# Patient Record
Sex: FEMALE | Race: WHITE | HISPANIC OR LATINO | URBAN - METROPOLITAN AREA
[De-identification: names, ages, dates, MRNs, and addresses within clinical notes are randomized per-mention and may not be internally consistent; named-entity substitution may affect disease eponyms.]

---

## 2018-03-08 ENCOUNTER — APPOINTMENT (RX ONLY)
Dept: URBAN - METROPOLITAN AREA CLINIC 93 | Facility: CLINIC | Age: 58
Setting detail: DERMATOLOGY
End: 2018-03-08

## 2018-03-08 DIAGNOSIS — L50.8 OTHER URTICARIA: ICD-10-CM

## 2018-03-08 DIAGNOSIS — B00.1 HERPESVIRAL VESICULAR DERMATITIS: ICD-10-CM

## 2018-03-08 PROCEDURE — ? PRESCRIPTION

## 2018-03-08 PROCEDURE — 99213 OFFICE O/P EST LOW 20 MIN: CPT

## 2018-03-08 PROCEDURE — ? COUNSELING

## 2018-03-08 PROCEDURE — ? PATIENT EDUCATION

## 2018-03-08 RX ORDER — LORATADINE 10 MG/1
TABLET ORAL
Qty: 30 | Refills: 5 | Status: ERX | COMMUNITY
Start: 2018-03-08

## 2018-03-08 RX ORDER — LEVOCETIRIZINE DIHYDROCHLORIDE 5 MG
TABLET ORAL
Qty: 30 | Refills: 5 | Status: ERX | COMMUNITY
Start: 2018-03-08

## 2018-03-08 RX ORDER — TRIAMCINOLONE ACETONIDE 0.25 MG/G
CREAM TOPICAL
Qty: 1 | Refills: 0 | Status: ERX | COMMUNITY
Start: 2018-03-08

## 2018-03-08 RX ADMIN — TRIAMCINOLONE ACETONIDE: 0.25 CREAM TOPICAL at 00:00

## 2018-03-08 RX ADMIN — LORATADINE: 10 TABLET ORAL at 00:00

## 2018-03-08 RX ADMIN — Medication: at 00:00

## 2018-03-08 ASSESSMENT — LOCATION SIMPLE DESCRIPTION DERM
LOCATION SIMPLE: ABDOMEN
LOCATION SIMPLE: RIGHT FOREARM
LOCATION SIMPLE: LEFT FOREARM

## 2018-03-08 ASSESSMENT — LOCATION DETAILED DESCRIPTION DERM
LOCATION DETAILED: RIGHT VENTRAL PROXIMAL FOREARM
LOCATION DETAILED: EPIGASTRIC SKIN
LOCATION DETAILED: LEFT VENTRAL PROXIMAL FOREARM

## 2018-03-08 ASSESSMENT — LOCATION ZONE DERM
LOCATION ZONE: TRUNK
LOCATION ZONE: ARM

## 2018-03-08 NOTE — HPI: HIVES (URTICARIA)
How Severe Are Your Hives?: mild
Is This A New Presentation, Or A Follow-Up?: Follow Up Urticaria
Additional History: Lov 8/10/17

## 2018-03-08 NOTE — PROCEDURE: PATIENT EDUCATION
Include Automatic Impression Counseling If It Exists: Yes
Render Patient Education In Note?: render abridged patient education
Abridged Text (If No Specifics Are Selected): Educational resources were provided and detailed information can be found on the patient education handout.
Detail Level: Generalized

## 2018-08-29 ENCOUNTER — APPOINTMENT (RX ONLY)
Dept: URBAN - METROPOLITAN AREA CLINIC 93 | Facility: CLINIC | Age: 58
Setting detail: DERMATOLOGY
End: 2018-08-29

## 2018-08-29 DIAGNOSIS — L73.8 OTHER SPECIFIED FOLLICULAR DISORDERS: ICD-10-CM

## 2018-08-29 DIAGNOSIS — L82.1 OTHER SEBORRHEIC KERATOSIS: ICD-10-CM

## 2018-08-29 DIAGNOSIS — L50.1 IDIOPATHIC URTICARIA: ICD-10-CM

## 2018-08-29 PROCEDURE — ? COUNSELING

## 2018-08-29 PROCEDURE — 99213 OFFICE O/P EST LOW 20 MIN: CPT

## 2018-08-29 PROCEDURE — ? PRESCRIPTION

## 2018-08-29 RX ORDER — LEVOCETIRIZINE DIHYDROCHLORIDE 5 MG/1
TABLET, FILM COATED ORAL
Qty: 30 | Refills: 5 | Status: ERX | COMMUNITY
Start: 2018-08-29

## 2018-08-29 RX ORDER — LORATADINE 10 MG/1
CAPSULE, LIQUID FILLED ORAL
Qty: 30 | Refills: 5 | Status: ERX | COMMUNITY
Start: 2018-08-29

## 2018-08-29 RX ORDER — TRIAMCINOLONE ACETONIDE 0.25 MG/G
CREAM TOPICAL
Qty: 1 | Refills: 3 | Status: ERX

## 2018-08-29 RX ADMIN — LORATADINE: 10 CAPSULE, LIQUID FILLED ORAL at 00:00

## 2018-08-29 RX ADMIN — LEVOCETIRIZINE DIHYDROCHLORIDE: 5 TABLET, FILM COATED ORAL at 00:00

## 2018-08-29 ASSESSMENT — LOCATION DETAILED DESCRIPTION DERM
LOCATION DETAILED: RIGHT ANTERIOR PROXIMAL THIGH
LOCATION DETAILED: LEFT LATERAL ABDOMEN
LOCATION DETAILED: RIGHT ANTECUBITAL SKIN
LOCATION DETAILED: PERIUMBILICAL SKIN
LOCATION DETAILED: LEFT ANTERIOR DISTAL THIGH

## 2018-08-29 ASSESSMENT — LOCATION ZONE DERM
LOCATION ZONE: LEG
LOCATION ZONE: TRUNK
LOCATION ZONE: ARM

## 2018-08-29 ASSESSMENT — LOCATION SIMPLE DESCRIPTION DERM
LOCATION SIMPLE: LEFT THIGH
LOCATION SIMPLE: ABDOMEN
LOCATION SIMPLE: RIGHT THIGH
LOCATION SIMPLE: RIGHT UPPER ARM

## 2019-10-03 ENCOUNTER — APPOINTMENT (RX ONLY)
Dept: URBAN - METROPOLITAN AREA CLINIC 93 | Facility: CLINIC | Age: 59
Setting detail: DERMATOLOGY
End: 2019-10-03

## 2019-10-03 DIAGNOSIS — L81.4 OTHER MELANIN HYPERPIGMENTATION: ICD-10-CM

## 2019-10-03 DIAGNOSIS — L50.1 IDIOPATHIC URTICARIA: ICD-10-CM

## 2019-10-03 PROCEDURE — ? COUNSELING

## 2019-10-03 PROCEDURE — ? ADDITIONAL NOTES

## 2019-10-03 PROCEDURE — ? PRESCRIPTION

## 2019-10-03 PROCEDURE — 99213 OFFICE O/P EST LOW 20 MIN: CPT

## 2019-10-03 RX ORDER — HYDROQUINONE 4 %
CREAM (GRAM) TOPICAL
Qty: 1 | Refills: 3 | Status: ERX | COMMUNITY
Start: 2019-10-03

## 2019-10-03 RX ORDER — TRIAMCINOLONE ACETONIDE 1 MG/G
CREAM TOPICAL
Qty: 1 | Refills: 1 | Status: ERX | COMMUNITY
Start: 2019-10-03

## 2019-10-03 RX ORDER — LEVOCETIRIZINE DIHYDROCHLORIDE 5 MG/1
TABLET, FILM COATED ORAL
Qty: 90 | Refills: 3 | Status: ERX

## 2019-10-03 RX ORDER — LORATADINE 10 MG/1
CAPSULE, LIQUID FILLED ORAL
Qty: 90 | Refills: 3 | Status: ERX

## 2019-10-03 RX ADMIN — TRIAMCINOLONE ACETONIDE: 1 CREAM TOPICAL at 00:00

## 2019-10-03 RX ADMIN — Medication: at 00:00

## 2019-10-03 ASSESSMENT — LOCATION SIMPLE DESCRIPTION DERM: LOCATION SIMPLE: RIGHT UPPER ARM

## 2019-10-03 ASSESSMENT — LOCATION ZONE DERM: LOCATION ZONE: ARM

## 2019-10-03 ASSESSMENT — LOCATION DETAILED DESCRIPTION DERM: LOCATION DETAILED: RIGHT ANTECUBITAL SKIN

## 2020-03-12 ENCOUNTER — RX ONLY (OUTPATIENT)
Age: 60
Setting detail: RX ONLY
End: 2020-03-12

## 2020-06-29 ENCOUNTER — APPOINTMENT (RX ONLY)
Dept: URBAN - METROPOLITAN AREA CLINIC 93 | Facility: CLINIC | Age: 60
Setting detail: DERMATOLOGY
End: 2020-06-29

## 2020-06-29 DIAGNOSIS — L50.1 IDIOPATHIC URTICARIA: ICD-10-CM

## 2020-06-29 DIAGNOSIS — L82.0 INFLAMED SEBORRHEIC KERATOSIS: ICD-10-CM

## 2020-06-29 DIAGNOSIS — L82.1 OTHER SEBORRHEIC KERATOSIS: ICD-10-CM

## 2020-06-29 DIAGNOSIS — B00.1 HERPESVIRAL VESICULAR DERMATITIS: ICD-10-CM

## 2020-06-29 DIAGNOSIS — L81.4 OTHER MELANIN HYPERPIGMENTATION: ICD-10-CM

## 2020-06-29 DIAGNOSIS — L56.5 DISSEMINATED SUPERFICIAL ACTINIC POROKERATOSIS (DSAP): ICD-10-CM

## 2020-06-29 PROCEDURE — ? LIQUID NITROGEN

## 2020-06-29 PROCEDURE — ? COUNSELING

## 2020-06-29 PROCEDURE — 99213 OFFICE O/P EST LOW 20 MIN: CPT | Mod: 25

## 2020-06-29 PROCEDURE — 17110 DESTRUCTION B9 LES UP TO 14: CPT

## 2020-06-29 PROCEDURE — ? PRESCRIPTION

## 2020-06-29 RX ORDER — LORATADINE 10 MG/1
CAPSULE, LIQUID FILLED ORAL
Qty: 90 | Refills: 3 | Status: ERX

## 2020-06-29 RX ORDER — VALACYCLOVIR HYDROCHLORIDE 500 MG/1
TABLET, FILM COATED ORAL
Qty: 90 | Refills: 2 | Status: ERX | COMMUNITY
Start: 2020-06-29

## 2020-06-29 RX ORDER — TRIAMCINOLONE ACETONIDE 1 MG/G
CREAM TOPICAL
Qty: 1 | Refills: 3 | Status: ERX

## 2020-06-29 RX ORDER — LEVOCETIRIZINE DIHYDROCHLORIDE 5 MG/1
TABLET, FILM COATED ORAL
Qty: 90 | Refills: 3 | Status: ERX

## 2020-06-29 RX ADMIN — VALACYCLOVIR HYDROCHLORIDE: 500 TABLET, FILM COATED ORAL at 00:00

## 2020-06-29 ASSESSMENT — LOCATION SIMPLE DESCRIPTION DERM
LOCATION SIMPLE: LEFT THIGH
LOCATION SIMPLE: RIGHT UPPER ARM

## 2020-06-29 ASSESSMENT — LOCATION ZONE DERM
LOCATION ZONE: LEG
LOCATION ZONE: ARM

## 2020-06-29 ASSESSMENT — LOCATION DETAILED DESCRIPTION DERM
LOCATION DETAILED: LEFT ANTERIOR PROXIMAL THIGH
LOCATION DETAILED: RIGHT ANTECUBITAL SKIN

## 2020-06-29 NOTE — PROCEDURE: LIQUID NITROGEN
Medical Necessity Information: It is in your best interest to select a reason for this procedure from the list below. All of these items fulfill various CMS LCD requirements except the new and changing color options.
Consent: The patient's consent was obtained including but not limited to risks of crusting, scabbing, blistering, scarring, darker or lighter pigmentary change, recurrence, incomplete removal and infection.
Add 52 Modifier (Optional): no
Detail Level: Simple
Medical Necessity Clause: This procedure was medically necessary because the lesions that were treated were:
Post-Care Instructions: I reviewed with the patient in detail post-care instructions. Patient is to wear sunprotection, and avoid picking at any of the treated lesions. Pt may apply Vaseline to crusted or scabbing areas.

## 2020-07-15 ENCOUNTER — APPOINTMENT (RX ONLY)
Dept: URBAN - METROPOLITAN AREA CLINIC 93 | Facility: CLINIC | Age: 60
Setting detail: DERMATOLOGY
End: 2020-07-15

## 2020-07-15 DIAGNOSIS — L82.0 INFLAMED SEBORRHEIC KERATOSIS: ICD-10-CM

## 2020-07-15 DIAGNOSIS — L01.01 NON-BULLOUS IMPETIGO: ICD-10-CM

## 2020-07-15 PROCEDURE — ? ADDITIONAL NOTES

## 2020-07-15 PROCEDURE — ? PRESCRIPTION

## 2020-07-15 PROCEDURE — ? FULL BODY SKIN EXAM - DECLINED

## 2020-07-15 PROCEDURE — ? COUNSELING

## 2020-07-15 PROCEDURE — 99213 OFFICE O/P EST LOW 20 MIN: CPT

## 2020-07-15 RX ORDER — MUPIROCIN 20 MG/G
OINTMENT TOPICAL
Qty: 1 | Refills: 1 | Status: ERX | COMMUNITY
Start: 2020-07-15

## 2020-07-15 RX ADMIN — MUPIROCIN: 20 OINTMENT TOPICAL at 00:00

## 2020-07-15 ASSESSMENT — LOCATION SIMPLE DESCRIPTION DERM: LOCATION SIMPLE: LEFT THIGH

## 2020-07-15 ASSESSMENT — LOCATION ZONE DERM: LOCATION ZONE: LEG

## 2020-07-15 ASSESSMENT — LOCATION DETAILED DESCRIPTION DERM: LOCATION DETAILED: LEFT ANTERIOR PROXIMAL THIGH

## 2020-07-15 NOTE — PROCEDURE: ADDITIONAL NOTES
Detail Level: Simple
Additional Notes: Patient comes into the office today for evaluation of ISKs that were treated with LN2 last week. Patient was concerned about infection and improper healing. Patient was reassured that there are no signs of infection but because there is some crusting, we will prescribe Mupirocin to reduce the risk of developing an infection. Also, we discussed that these skin lesions may take 4-6 weeks to fully heal. Will follow up in 1 month to reassess.

## 2020-10-14 ENCOUNTER — APPOINTMENT (RX ONLY)
Dept: URBAN - METROPOLITAN AREA CLINIC 93 | Facility: CLINIC | Age: 60
Setting detail: DERMATOLOGY
End: 2020-10-14

## 2020-10-14 DIAGNOSIS — L81.1 CHLOASMA: ICD-10-CM

## 2020-10-14 DIAGNOSIS — T07XXXA INSECT BITE, NONVENOMOUS, OF OTHER, MULTIPLE, AND UNSPECIFIED SITES, WITHOUT MENTION OF INFECTION: ICD-10-CM

## 2020-10-14 DIAGNOSIS — L82.0 INFLAMED SEBORRHEIC KERATOSIS: ICD-10-CM

## 2020-10-14 DIAGNOSIS — L50.1 IDIOPATHIC URTICARIA: ICD-10-CM

## 2020-10-14 DIAGNOSIS — L01.01 NON-BULLOUS IMPETIGO: ICD-10-CM | Status: RESOLVED

## 2020-10-14 PROBLEM — S00.86XA INSECT BITE (NONVENOMOUS) OF OTHER PART OF HEAD, INITIAL ENCOUNTER: Status: ACTIVE | Noted: 2020-10-14

## 2020-10-14 PROCEDURE — ? COUNSELING

## 2020-10-14 PROCEDURE — ? ADDITIONAL NOTES

## 2020-10-14 PROCEDURE — ? PRESCRIPTION

## 2020-10-14 PROCEDURE — 99214 OFFICE O/P EST MOD 30 MIN: CPT

## 2020-10-14 PROCEDURE — ? PHOTO-DOCUMENTATION

## 2020-10-14 PROCEDURE — ? FULL BODY SKIN EXAM - DECLINED

## 2020-10-14 RX ORDER — PHARMACY COMPOUNDING ACCESSORY
EACH MISCELLANEOUS
Qty: 1 | Refills: 1 | Status: ERX | COMMUNITY
Start: 2020-10-14

## 2020-10-14 RX ORDER — LEVOCETIRIZINE DIHYDROCHLORIDE 5 MG/1
TABLET, FILM COATED ORAL
Qty: 30 | Refills: 2 | Status: ERX

## 2020-10-14 RX ORDER — LORATADINE 10 MG/1
CAPSULE, LIQUID FILLED ORAL
Qty: 30 | Refills: 2 | Status: ERX

## 2020-10-14 RX ORDER — HYDROCORTISONE 25 MG/G
OINTMENT TOPICAL
Qty: 1 | Refills: 0 | Status: ERX | COMMUNITY
Start: 2020-10-14

## 2020-10-14 RX ADMIN — HYDROCORTISONE: 25 OINTMENT TOPICAL at 00:00

## 2020-10-14 RX ADMIN — Medication: at 00:00

## 2020-10-14 ASSESSMENT — LOCATION DETAILED DESCRIPTION DERM
LOCATION DETAILED: LEFT CENTRAL MALAR CHEEK
LOCATION DETAILED: RIGHT ANTECUBITAL SKIN
LOCATION DETAILED: RIGHT CENTRAL MALAR CHEEK
LOCATION DETAILED: RIGHT LATERAL FOREHEAD
LOCATION DETAILED: LEFT ANTERIOR PROXIMAL THIGH

## 2020-10-14 ASSESSMENT — LOCATION SIMPLE DESCRIPTION DERM
LOCATION SIMPLE: LEFT CHEEK
LOCATION SIMPLE: RIGHT FOREHEAD
LOCATION SIMPLE: LEFT THIGH
LOCATION SIMPLE: RIGHT UPPER ARM
LOCATION SIMPLE: RIGHT CHEEK

## 2020-10-14 ASSESSMENT — LOCATION ZONE DERM
LOCATION ZONE: LEG
LOCATION ZONE: ARM
LOCATION ZONE: FACE

## 2020-10-14 NOTE — PROCEDURE: ADDITIONAL NOTES
Detail Level: Simple
Additional Notes: Patient comes into the office today for evaluation of ISKs that were treated with LN2 on 6/29/2020. Patient was concerned about infection and improper healing; therefore, we prescribed Mupirocin to reduce the risk of developing an infection. Today, the lesion appears to have healed and there is evidence of PIH. Patient is aware that this will continue to improve over the next few months.

## 2020-10-14 NOTE — PROCEDURE: FULL BODY SKIN EXAM - DECLINED
Detail Level: Simple
Instructions: This plan will send the code FBSD to the PM system.  DO NOT or CHANGE the price.
Price (Do Not Change): 0.00
Eyes with no visual disturbances.  Ears clean and dry and no hearing difficulties. Nose with pink mucosa and no drainage.  Mouth mucous membranes moist and pink.  No tenderness or swelling to throat or neck.

## 2021-05-19 ENCOUNTER — APPOINTMENT (RX ONLY)
Dept: URBAN - METROPOLITAN AREA CLINIC 93 | Facility: CLINIC | Age: 61
Setting detail: DERMATOLOGY
End: 2021-05-19

## 2021-05-19 DIAGNOSIS — L50.1 IDIOPATHIC URTICARIA: ICD-10-CM | Status: INADEQUATELY CONTROLLED

## 2021-05-19 DIAGNOSIS — L81.1 CHLOASMA: ICD-10-CM | Status: IMPROVED

## 2021-05-19 DIAGNOSIS — T07XXXA INSECT BITE, NONVENOMOUS, OF OTHER, MULTIPLE, AND UNSPECIFIED SITES, WITHOUT MENTION OF INFECTION: ICD-10-CM | Status: RESOLVING

## 2021-05-19 DIAGNOSIS — L82.1 OTHER SEBORRHEIC KERATOSIS: ICD-10-CM | Status: STABLE

## 2021-05-19 PROBLEM — S50.361A INSECT BITE (NONVENOMOUS) OF RIGHT ELBOW, INITIAL ENCOUNTER: Status: ACTIVE | Noted: 2021-05-19

## 2021-05-19 PROCEDURE — ? TREATMENT REGIMEN

## 2021-05-19 PROCEDURE — 99214 OFFICE O/P EST MOD 30 MIN: CPT

## 2021-05-19 PROCEDURE — ? ADDITIONAL NOTES

## 2021-05-19 PROCEDURE — ? FULL BODY SKIN EXAM - DECLINED

## 2021-05-19 PROCEDURE — ? PRESCRIPTION

## 2021-05-19 PROCEDURE — ? COUNSELING

## 2021-05-19 RX ORDER — LORATADINE 10 MG/1
CAPSULE, LIQUID FILLED ORAL
Qty: 30 | Refills: 2 | Status: ERX

## 2021-05-19 RX ORDER — PHARMACY COMPOUNDING ACCESSORY
EACH MISCELLANEOUS
Qty: 1 | Refills: 1 | Status: ERX | COMMUNITY
Start: 2021-05-19

## 2021-05-19 RX ORDER — LEVOCETIRIZINE DIHYDROCHLORIDE 5 MG/1
TABLET, FILM COATED ORAL
Qty: 30 | Refills: 2 | Status: ERX

## 2021-05-19 RX ADMIN — Medication: at 00:00

## 2021-05-19 ASSESSMENT — LOCATION SIMPLE DESCRIPTION DERM
LOCATION SIMPLE: RIGHT CHEEK
LOCATION SIMPLE: RIGHT ELBOW
LOCATION SIMPLE: RIGHT UPPER ARM
LOCATION SIMPLE: LEFT CHEEK

## 2021-05-19 ASSESSMENT — LOCATION DETAILED DESCRIPTION DERM
LOCATION DETAILED: RIGHT CENTRAL BUCCAL CHEEK
LOCATION DETAILED: RIGHT CENTRAL MALAR CHEEK
LOCATION DETAILED: LEFT CENTRAL MALAR CHEEK
LOCATION DETAILED: RIGHT ANTECUBITAL SKIN
LOCATION DETAILED: RIGHT ELBOW

## 2021-05-19 ASSESSMENT — LOCATION ZONE DERM
LOCATION ZONE: ARM
LOCATION ZONE: FACE

## 2021-05-19 NOTE — PROCEDURE: ADDITIONAL NOTES
Additional Notes: Upon evaluation today, site looks like a resolving arthropod assault.
Detail Level: Simple
Render Risk Assessment In Note?: no

## 2021-05-19 NOTE — PROCEDURE: TREATMENT REGIMEN
Continue Regimen: Topical lightening cream - apply a small amount to the dark spots on the face 2-3 nights a week
Detail Level: Zone
Otc Regimen: Apply sunscreen SPF +35 to the face daily. Reapply every two hours during sun exposure.
Continue Regimen: levocetirizine 5 mg tablet: Take one tab PO QHS\\nloratadine 10 mg capsule: Take one tab PO QAM
Plan: Treatment goal is clear skin. Patient is not at treatment goal.

## 2021-08-17 RX ORDER — LEVOCETIRIZINE DIHYDROCHLORIDE 5 MG/1
TABLET, FILM COATED ORAL
Qty: 30 | Refills: 3 | Status: CANCELLED
Stop reason: CLARIF

## 2021-10-01 ENCOUNTER — RX ONLY (OUTPATIENT)
Age: 61
Setting detail: RX ONLY
End: 2021-10-01

## 2021-10-01 RX ORDER — LEVOCETIRIZINE DIHYDROCHLORIDE 5 MG/1
TABLET, FILM COATED ORAL
Qty: 30 | Refills: 2 | Status: ERX

## 2021-10-01 RX ORDER — LORATADINE 10 MG/1
CAPSULE, LIQUID FILLED ORAL
Qty: 30 | Refills: 2 | Status: CANCELLED | COMMUNITY
Start: 2021-10-01

## 2021-10-01 RX ORDER — LORATADINE 10 MG/1
CAPSULE, LIQUID FILLED ORAL
Qty: 30 | Refills: 2 | Status: ERX

## 2021-10-01 RX ORDER — LEVOCETIRIZINE DIHYDROCHLORIDE 5 MG/1
TABLET, FILM COATED ORAL
Qty: 30 | Refills: 2 | Status: CANCELLED | COMMUNITY
Start: 2021-10-01

## 2022-07-11 ENCOUNTER — APPOINTMENT (RX ONLY)
Dept: URBAN - METROPOLITAN AREA CLINIC 93 | Facility: CLINIC | Age: 62
Setting detail: DERMATOLOGY
End: 2022-07-11

## 2022-07-11 DIAGNOSIS — L81.4 OTHER MELANIN HYPERPIGMENTATION: ICD-10-CM

## 2022-07-11 DIAGNOSIS — Z12.83 ENCOUNTER FOR SCREENING FOR MALIGNANT NEOPLASM OF SKIN: ICD-10-CM

## 2022-07-11 DIAGNOSIS — L81.1 CHLOASMA: ICD-10-CM

## 2022-07-11 DIAGNOSIS — D18.0 HEMANGIOMA: ICD-10-CM

## 2022-07-11 DIAGNOSIS — L50.1 IDIOPATHIC URTICARIA: ICD-10-CM

## 2022-07-11 DIAGNOSIS — L82.1 OTHER SEBORRHEIC KERATOSIS: ICD-10-CM

## 2022-07-11 DIAGNOSIS — D22 MELANOCYTIC NEVI: ICD-10-CM

## 2022-07-11 PROBLEM — D18.01 HEMANGIOMA OF SKIN AND SUBCUTANEOUS TISSUE: Status: ACTIVE | Noted: 2022-07-11

## 2022-07-11 PROBLEM — D22.5 MELANOCYTIC NEVI OF TRUNK: Status: ACTIVE | Noted: 2022-07-11

## 2022-07-11 PROCEDURE — ? FULL BODY SKIN EXAM

## 2022-07-11 PROCEDURE — ? COUNSELING

## 2022-07-11 PROCEDURE — ? PRESCRIPTION

## 2022-07-11 PROCEDURE — 99214 OFFICE O/P EST MOD 30 MIN: CPT

## 2022-07-11 PROCEDURE — ? TREATMENT REGIMEN

## 2022-07-11 RX ORDER — PHARMACY COMPOUNDING ACCESSORY
EACH MISCELLANEOUS
Qty: 1 | Refills: 11 | Status: ERX | COMMUNITY
Start: 2022-07-11

## 2022-07-11 RX ORDER — LORATADINE 10 MG/1
CAPSULE, LIQUID FILLED ORAL
Qty: 30 | Refills: 2 | Status: ERX

## 2022-07-11 RX ORDER — LEVOCETIRIZINE DIHYDROCHLORIDE 5 MG/1
TABLET, FILM COATED ORAL
Qty: 30 | Refills: 2 | Status: ERX

## 2022-07-11 RX ADMIN — Medication: at 00:00

## 2022-07-11 ASSESSMENT — LOCATION ZONE DERM
LOCATION ZONE: FACE
LOCATION ZONE: ARM
LOCATION ZONE: TRUNK

## 2022-07-11 ASSESSMENT — LOCATION DETAILED DESCRIPTION DERM
LOCATION DETAILED: PERIUMBILICAL SKIN
LOCATION DETAILED: LEFT CENTRAL MALAR CHEEK
LOCATION DETAILED: RIGHT ANTECUBITAL SKIN
LOCATION DETAILED: LEFT PROXIMAL DORSAL FOREARM
LOCATION DETAILED: RIGHT CENTRAL MALAR CHEEK
LOCATION DETAILED: EPIGASTRIC SKIN
LOCATION DETAILED: LEFT SUPERIOR UPPER BACK
LOCATION DETAILED: LEFT MEDIAL SUPERIOR CHEST

## 2022-07-11 ASSESSMENT — LOCATION SIMPLE DESCRIPTION DERM
LOCATION SIMPLE: RIGHT CHEEK
LOCATION SIMPLE: LEFT UPPER BACK
LOCATION SIMPLE: CHEST
LOCATION SIMPLE: LEFT FOREARM
LOCATION SIMPLE: ABDOMEN
LOCATION SIMPLE: LEFT CHEEK
LOCATION SIMPLE: RIGHT UPPER ARM

## 2023-02-16 ENCOUNTER — LAB (OUTPATIENT)
Dept: LAB | Facility: HOSPITAL | Age: 63
End: 2023-02-16
Payer: MEDICAID

## 2023-02-16 ENCOUNTER — OFFICE VISIT (OUTPATIENT)
Dept: INTERNAL MEDICINE | Facility: CLINIC | Age: 63
End: 2023-02-16
Payer: MEDICAID

## 2023-02-16 VITALS
HEIGHT: 62 IN | SYSTOLIC BLOOD PRESSURE: 130 MMHG | BODY MASS INDEX: 25.76 KG/M2 | TEMPERATURE: 97.1 F | DIASTOLIC BLOOD PRESSURE: 78 MMHG | OXYGEN SATURATION: 99 % | HEART RATE: 90 BPM | WEIGHT: 140 LBS

## 2023-02-16 DIAGNOSIS — R73.03 PREDIABETES: ICD-10-CM

## 2023-02-16 DIAGNOSIS — M89.8X8 STERNAL MASS: Primary | ICD-10-CM

## 2023-02-16 DIAGNOSIS — Z82.49 FAMILY HISTORY OF HEART DISEASE: ICD-10-CM

## 2023-02-16 DIAGNOSIS — Z12.31 ENCOUNTER FOR SCREENING MAMMOGRAM FOR MALIGNANT NEOPLASM OF BREAST: ICD-10-CM

## 2023-02-16 DIAGNOSIS — E78.2 MIXED HYPERLIPIDEMIA: ICD-10-CM

## 2023-02-16 DIAGNOSIS — L50.9 HIVES: ICD-10-CM

## 2023-02-16 DIAGNOSIS — Z13.29 SCREENING FOR ENDOCRINE DISORDER: ICD-10-CM

## 2023-02-16 LAB
ALBUMIN SERPL-MCNC: 4.6 G/DL (ref 3.5–5.2)
ALBUMIN/GLOB SERPL: 1.7 G/DL
ALP SERPL-CCNC: 75 U/L (ref 39–117)
ALT SERPL W P-5'-P-CCNC: 9 U/L (ref 1–33)
ANION GAP SERPL CALCULATED.3IONS-SCNC: 8.3 MMOL/L (ref 5–15)
AST SERPL-CCNC: 16 U/L (ref 1–32)
BASOPHILS # BLD AUTO: 0.06 10*3/MM3 (ref 0–0.2)
BASOPHILS NFR BLD AUTO: 1 % (ref 0–1.5)
BILIRUB SERPL-MCNC: 0.3 MG/DL (ref 0–1.2)
BUN SERPL-MCNC: 13 MG/DL (ref 8–23)
BUN/CREAT SERPL: 18.3 (ref 7–25)
CALCIUM SPEC-SCNC: 9.6 MG/DL (ref 8.6–10.5)
CHLORIDE SERPL-SCNC: 104 MMOL/L (ref 98–107)
CHOLEST SERPL-MCNC: 213 MG/DL (ref 0–200)
CO2 SERPL-SCNC: 26.7 MMOL/L (ref 22–29)
CREAT SERPL-MCNC: 0.71 MG/DL (ref 0.57–1)
DEPRECATED RDW RBC AUTO: 39 FL (ref 37–54)
EGFRCR SERPLBLD CKD-EPI 2021: 96.3 ML/MIN/1.73
EOSINOPHIL # BLD AUTO: 0.33 10*3/MM3 (ref 0–0.4)
EOSINOPHIL NFR BLD AUTO: 5.3 % (ref 0.3–6.2)
ERYTHROCYTE [DISTWIDTH] IN BLOOD BY AUTOMATED COUNT: 12.9 % (ref 12.3–15.4)
GLOBULIN UR ELPH-MCNC: 2.7 GM/DL
GLUCOSE SERPL-MCNC: 91 MG/DL (ref 65–99)
HCT VFR BLD AUTO: 41.9 % (ref 34–46.6)
HDLC SERPL-MCNC: 55 MG/DL (ref 40–60)
HGB BLD-MCNC: 13.9 G/DL (ref 12–15.9)
IMM GRANULOCYTES # BLD AUTO: 0.01 10*3/MM3 (ref 0–0.05)
IMM GRANULOCYTES NFR BLD AUTO: 0.2 % (ref 0–0.5)
LDLC SERPL CALC-MCNC: 131 MG/DL (ref 0–100)
LDLC/HDLC SERPL: 2.32 {RATIO}
LYMPHOCYTES # BLD AUTO: 2.08 10*3/MM3 (ref 0.7–3.1)
LYMPHOCYTES NFR BLD AUTO: 33.3 % (ref 19.6–45.3)
MCH RBC QN AUTO: 28.3 PG (ref 26.6–33)
MCHC RBC AUTO-ENTMCNC: 33.2 G/DL (ref 31.5–35.7)
MCV RBC AUTO: 85.3 FL (ref 79–97)
MONOCYTES # BLD AUTO: 0.34 10*3/MM3 (ref 0.1–0.9)
MONOCYTES NFR BLD AUTO: 5.4 % (ref 5–12)
NEUTROPHILS NFR BLD AUTO: 3.43 10*3/MM3 (ref 1.7–7)
NEUTROPHILS NFR BLD AUTO: 54.8 % (ref 42.7–76)
NRBC BLD AUTO-RTO: 0 /100 WBC (ref 0–0.2)
PLATELET # BLD AUTO: 226 10*3/MM3 (ref 140–450)
PMV BLD AUTO: 10.9 FL (ref 6–12)
POTASSIUM SERPL-SCNC: 3.9 MMOL/L (ref 3.5–5.2)
PROT SERPL-MCNC: 7.3 G/DL (ref 6–8.5)
RBC # BLD AUTO: 4.91 10*6/MM3 (ref 3.77–5.28)
SODIUM SERPL-SCNC: 139 MMOL/L (ref 136–145)
TRIGL SERPL-MCNC: 153 MG/DL (ref 0–150)
VLDLC SERPL-MCNC: 27 MG/DL (ref 5–40)
WBC NRBC COR # BLD: 6.25 10*3/MM3 (ref 3.4–10.8)

## 2023-02-16 PROCEDURE — 99204 OFFICE O/P NEW MOD 45 MIN: CPT | Performed by: NURSE PRACTITIONER

## 2023-02-16 PROCEDURE — 80061 LIPID PANEL: CPT | Performed by: NURSE PRACTITIONER

## 2023-02-16 PROCEDURE — 80050 GENERAL HEALTH PANEL: CPT

## 2023-02-16 PROCEDURE — 83036 HEMOGLOBIN GLYCOSYLATED A1C: CPT

## 2023-02-16 PROCEDURE — 36415 COLL VENOUS BLD VENIPUNCTURE: CPT

## 2023-02-16 RX ORDER — HYDROXYZINE HYDROCHLORIDE 25 MG/1
25 TABLET, FILM COATED ORAL 3 TIMES DAILY PRN
Qty: 90 TABLET | Refills: 3 | Status: SHIPPED | OUTPATIENT
Start: 2023-02-16

## 2023-02-16 RX ORDER — LEVOCETIRIZINE DIHYDROCHLORIDE 5 MG/1
5 TABLET, FILM COATED ORAL
COMMUNITY
Start: 2022-11-17

## 2023-02-16 RX ORDER — HYDROXYZINE HYDROCHLORIDE 25 MG/1
TABLET, FILM COATED ORAL
COMMUNITY
Start: 2022-12-24 | End: 2023-02-16 | Stop reason: SDUPTHER

## 2023-02-16 RX ORDER — TRAVOPROST OPHTHALMIC SOLUTION 0.04 MG/ML
SOLUTION OPHTHALMIC
COMMUNITY
Start: 2022-11-19 | End: 2023-02-21 | Stop reason: SDUPTHER

## 2023-02-16 RX ORDER — LORATADINE 10 MG/1
CAPSULE, LIQUID FILLED ORAL
COMMUNITY

## 2023-02-16 RX ORDER — DORZOLAMIDE HCL 20 MG/ML
SOLUTION/ DROPS OPHTHALMIC
COMMUNITY
Start: 2023-01-18 | End: 2023-02-21 | Stop reason: SDUPTHER

## 2023-02-16 NOTE — PROGRESS NOTES
Subjective   Chief Complaint   Patient presents with   • bump on neck       Melly Cantor is a 62 y.o. female here today as a new patient to establish care.  Pt recently relocated from [place].  She complains of a mass at the bottom of her neck.   She states it was first noticed last year and her previous provider ordered a CT and MRI in her chest.  She states there was a fluid collection noted and it was recommended to be repeated in 6 months.    Requesting referral to cardiology.  States because of her strong family history she was being followed by cardiology on a regular basis.      Review of Systems   Constitutional: Negative for activity change, appetite change and fatigue.   HENT: Negative for congestion.    Respiratory: Negative for cough and shortness of breath.    Cardiovascular: Negative for chest pain and leg swelling.   Gastrointestinal: Negative for abdominal pain.   Neurological: Negative for dizziness, weakness and confusion.   Psychiatric/Behavioral: Negative for behavioral problems and decreased concentration.       Past Medical History:   Diagnosis Date   • Hyperlipidemia    • Prediabetes      Past Surgical History:   Procedure Laterality Date   •  SECTION     • TUBAL ABDOMINAL LIGATION       Family History   Problem Relation Age of Onset   • Hyperlipidemia Mother    • Hypertension Mother    • Diabetes Mother    • Arthritis Mother    • Heart attack Mother    • Stroke Father    • Kidney disease Father    • Hyperlipidemia Father    • Hypertension Father    • Heart attack Father    • Diabetes Father    • Arthritis Father    • Kidney cancer Father    • Thyroid disease Sister    • Hyperlipidemia Sister    • Diabetes Sister    • Thyroid disease Brother    • Stroke Brother    • Hyperlipidemia Brother    • Hypertension Brother    • Diabetes Brother      Social History     Tobacco Use   Smoking Status Never   Smokeless Tobacco Never      Social History     Substance and Sexual Activity   Alcohol Use  "Never      Current Outpatient Medications on File Prior to Visit   Medication Sig   • dorzolamide (TRUSOPT) 2 % ophthalmic solution APPLY ONE DROP TO BOTH EYES THREE TIMES DAILY   • levocetirizine (XYZAL) 5 MG tablet Take 5 mg by mouth every night at bedtime.   • Loratadine 10 MG capsule loratadine 10 mg capsule   Take 1 capsule as needed by oral route.   • travoprost, BAK free, (TRAVATAN) 0.004 % solution ophthalmic solution INSTILL 1 DROP IN BOTH EYES EVERY DAY   • [DISCONTINUED] hydrOXYzine (ATARAX) 25 MG tablet TAKE 1 TABLET BY MOUTH EVERY 6 TO 8 HOURS AS NEEDED FOR ITCHING     No current facility-administered medications on file prior to visit.     Allergies   Allergen Reactions   • Compazine [Prochlorperazine] Unknown - High Severity   • Penicillins Unknown - High Severity   • Phenergan [Promethazine] Unknown - High Severity   • Zithromax [Azithromycin] Unknown - High Severity       Objective   Vitals:    02/16/23 1031   BP: 130/78   BP Location: Left arm   Patient Position: Sitting   Pulse: 90   Temp: 97.1 °F (36.2 °C)   SpO2: 99%   Weight: 63.5 kg (140 lb)   Height: 156.2 cm (61.5\")     Body mass index is 26.02 kg/m².    Physical Exam  Vitals and nursing note reviewed.   HENT:      Head: Normocephalic.   Eyes:      Pupils: Pupils are equal, round, and reactive to light.   Cardiovascular:      Rate and Rhythm: Normal rate and regular rhythm.      Pulses: Normal pulses.      Heart sounds: Normal heart sounds.   Pulmonary:      Effort: Pulmonary effort is normal.      Breath sounds: Normal breath sounds.   Chest:       Skin:     General: Skin is warm and dry.      Capillary Refill: Capillary refill takes less than 2 seconds.   Neurological:      General: No focal deficit present.      Mental Status: She is alert and oriented to person, place, and time.      Gait: Gait is intact.   Psychiatric:         Attention and Perception: Attention normal.         Mood and Affect: Mood normal.         Behavior: Behavior " normal.         Assessment & Plan   Problem List Items Addressed This Visit    None  Visit Diagnoses     Sternal mass    -  Primary    Relevant Orders    CT Chest With Contrast    MRI chest w wo contrast    Mixed hyperlipidemia        Relevant Orders    CBC w AUTO Differential    Comprehensive Metabolic Panel    Lipid Panel    Prediabetes        Relevant Orders    Hemoglobin A1c    Encounter for screening mammogram for malignant neoplasm of breast        Relevant Orders    Mammo Screening Digital Tomosynthesis Bilateral With CAD    Screening for endocrine disorder        Relevant Orders    TSH Rfx On Abnormal To Free T4    Hives        Relevant Medications    hydrOXYzine (ATARAX) 25 MG tablet    Family history of heart disease        Relevant Orders    Ambulatory Referral to Cardiology         Pt to send me her previous mammogram/colonoscopy in [place]  Previous CT/MRI of chest reviewed, will order f/u diagnostics of mass  Refer to cardio per pt request due to family hx  Request cardio records - [facility], [phone]  Labs today  Order mammo      Current Outpatient Medications:   •  dorzolamide (TRUSOPT) 2 % ophthalmic solution, APPLY ONE DROP TO BOTH EYES THREE TIMES DAILY, Disp: , Rfl:   •  hydrOXYzine (ATARAX) 25 MG tablet, Take 1 tablet by mouth 3 (Three) Times a Day As Needed (Hives)., Disp: 90 tablet, Rfl: 3  •  levocetirizine (XYZAL) 5 MG tablet, Take 5 mg by mouth every night at bedtime., Disp: , Rfl:   •  Loratadine 10 MG capsule, loratadine 10 mg capsule  Take 1 capsule as needed by oral route., Disp: , Rfl:   •  travoprost, BAK free, (TRAVATAN) 0.004 % solution ophthalmic solution, INSTILL 1 DROP IN BOTH EYES EVERY DAY, Disp: , Rfl:        Plan of care reviewed with the patient at the conclusion of today's visit.  Education was provided regarding diagnosis, management, and any prescribed or recommended OTC medications.  Patient verbalized understanding of and agreement with management plan.     Return  in about 4 months (around 6/16/2023) for Annual.        RENETTA Hernandez

## 2023-02-17 LAB
HBA1C MFR BLD: 6 % (ref 4.8–5.6)
TSH SERPL DL<=0.05 MIU/L-ACNC: 1.48 UIU/ML (ref 0.27–4.2)

## 2023-02-20 DIAGNOSIS — R73.03 PREDIABETES: Primary | ICD-10-CM

## 2023-02-20 RX ORDER — LANCETS 30 GAUGE
EACH MISCELLANEOUS
Qty: 100 EACH | Refills: 5 | Status: SHIPPED | OUTPATIENT
Start: 2023-02-20

## 2023-02-20 RX ORDER — BLOOD-GLUCOSE METER
1 KIT MISCELLANEOUS AS NEEDED
Qty: 1 EACH | Refills: 0 | Status: SHIPPED | OUTPATIENT
Start: 2023-02-20

## 2023-02-20 RX ORDER — DORZOLAMIDE HCL 20 MG/ML
SOLUTION/ DROPS OPHTHALMIC
Status: CANCELLED | OUTPATIENT
Start: 2023-02-20

## 2023-02-20 RX ORDER — TRAVOPROST OPHTHALMIC SOLUTION 0.04 MG/ML
SOLUTION OPHTHALMIC
Status: CANCELLED | OUTPATIENT
Start: 2023-02-20

## 2023-02-20 NOTE — TELEPHONE ENCOUNTER
PT CALLED THE  OFFICE AND SAID SHE HAD A MISSED CALL, BUT SHE GOT THE VOICEMAIL. PT DECLINED TO START MEDICATION FOR HER A1C AND STATED SHE WOULD START A BETTER DIET. THE PT SAID THERE WAS SOMETHING ABOUT OLIVE OIL BEING USED, BUT SHE DIDN'T KNOW WHAT FOR AND SHE SAID THE VOLUME GOT REALLY LOW AND SHE COULDN'T UNDERSTAND THE REST OF THE VOICEMAIL, PLEASE ADVISE.    PT KNOWS MEDICATION HASNT BEEN CALLED IN JUST YET, AND WILL LOOK OUT FOR THE PHARMACY TO CALL HER.

## 2023-02-20 NOTE — TELEPHONE ENCOUNTER
Caller: Devaughn Melly ALLEN    Relationship: Self    Best call back number: [phone]    Requested Prescriptions:   Requested Prescriptions     Pending Prescriptions Disp Refills   • dorzolamide (TRUSOPT) 2 % ophthalmic solution     • travoprost, RAUL free, (TRAVATAN) 0.004 % solution ophthalmic solution       Sig: in affected eye(s)      Pharmacy where request should be sent: [id number] [place], [place] - [address]  [phone]  - [phone] FX     Does the patient have less than a 3 day supply:  [x] Yes  [] No    Would you like a call back once the refill request has been completed: [x] Yes [] No    If the office needs to give you a call back, can they leave a voicemail: [x] Yes [] No    Cici Reynaga Rep   02/20/23 10:43 EST

## 2023-02-21 ENCOUNTER — PRIOR AUTHORIZATION (OUTPATIENT)
Dept: INTERNAL MEDICINE | Facility: CLINIC | Age: 63
End: 2023-02-21
Payer: MEDICAID

## 2023-02-21 ENCOUNTER — TELEPHONE (OUTPATIENT)
Dept: INTERNAL MEDICINE | Facility: CLINIC | Age: 63
End: 2023-02-21
Payer: MEDICAID

## 2023-02-21 RX ORDER — DORZOLAMIDE HCL 20 MG/ML
1 SOLUTION/ DROPS OPHTHALMIC 4 TIMES DAILY
Qty: 10 ML | Refills: 2 | Status: SHIPPED | OUTPATIENT
Start: 2023-02-21

## 2023-02-21 RX ORDER — TRAVOPROST OPHTHALMIC SOLUTION 0.04 MG/ML
1 SOLUTION OPHTHALMIC DAILY
Qty: 2.5 ML | Refills: 2 | Status: SHIPPED | OUTPATIENT
Start: 2023-02-21

## 2023-02-21 NOTE — TELEPHONE ENCOUNTER
Caller: Melly Cantor    Relationship: Self    Best call back number: [phone]    What test/procedure requested: travoprost, RAUL free, (TRAVATAN) 0.004 % solution ophthalmic solution    When is it needed: ASAP    Where is the test/procedure going to be performed: [id number] [place] [address] AT [address] - [phone] [facility] [phone] FX    Additional information or concerns: PATIENTS PHARMACY INFORMED HER, THAT HER INSURANCE REQUIRES A PRIOR AUTHORIZATION. PATIENT IS ALSO REQUESTING A CALL BACK FROM MICHAEL. PLEASE ADVISE

## 2023-02-22 ENCOUNTER — PRIOR AUTHORIZATION (OUTPATIENT)
Dept: INTERNAL MEDICINE | Facility: CLINIC | Age: 63
End: 2023-02-22
Payer: MEDICAID

## 2023-03-03 ENCOUNTER — HOSPITAL ENCOUNTER (OUTPATIENT)
Dept: MAMMOGRAPHY | Facility: HOSPITAL | Age: 63
Discharge: HOME | End: 2023-03-03
Payer: MEDICAID

## 2023-03-03 ENCOUNTER — APPOINTMENT (OUTPATIENT)
Dept: OTHER | Facility: HOSPITAL | Age: 63
End: 2023-03-03
Payer: MEDICAID

## 2023-03-03 DIAGNOSIS — Z12.31 ENCOUNTER FOR SCREENING MAMMOGRAM FOR MALIGNANT NEOPLASM OF BREAST: ICD-10-CM

## 2023-03-03 PROCEDURE — 77067 SCR MAMMO BI INCL CAD: CPT | Performed by: RADIOLOGY

## 2023-03-03 PROCEDURE — 77063 BREAST TOMOSYNTHESIS BI: CPT

## 2023-03-03 PROCEDURE — 77067 SCR MAMMO BI INCL CAD: CPT

## 2023-03-03 PROCEDURE — 77063 BREAST TOMOSYNTHESIS BI: CPT | Performed by: RADIOLOGY

## 2023-03-06 ENCOUNTER — TELEPHONE (OUTPATIENT)
Dept: INTERNAL MEDICINE | Facility: CLINIC | Age: 63
End: 2023-03-06

## 2023-03-06 NOTE — TELEPHONE ENCOUNTER
Caller: Melly Cantor    Relationship: Self    Best call back number: [phone]    Caller requesting test results:SELF    What test was performed: NOT SURE    When was the test performed:     Where was the test performed:     Additional notes: GOT A [employer] MESSAGE THAT SHE HAS TEST RESULTS. SHE IS UNABLE TO LOG IN AND PREFERS TO SPEAK TO A PERSON ABOUT THE RESULTS.    PLEASE CALL AND ADVISE

## 2023-03-10 ENCOUNTER — OFFICE VISIT (OUTPATIENT)
Dept: INTERNAL MEDICINE | Facility: CLINIC | Age: 63
End: 2023-03-10
Payer: MEDICAID

## 2023-03-10 VITALS
OXYGEN SATURATION: 99 % | WEIGHT: 116 LBS | SYSTOLIC BLOOD PRESSURE: 128 MMHG | DIASTOLIC BLOOD PRESSURE: 78 MMHG | BODY MASS INDEX: 21.9 KG/M2 | TEMPERATURE: 97.1 F | HEIGHT: 61 IN | HEART RATE: 102 BPM

## 2023-03-10 DIAGNOSIS — R73.03 PREDIABETES: ICD-10-CM

## 2023-03-10 DIAGNOSIS — F41.1 GENERALIZED ANXIETY DISORDER: ICD-10-CM

## 2023-03-10 DIAGNOSIS — G50.0 TRIGEMINAL NEURALGIA OF LEFT SIDE OF FACE: Primary | ICD-10-CM

## 2023-03-10 DIAGNOSIS — E78.2 MIXED HYPERLIPIDEMIA: ICD-10-CM

## 2023-03-10 PROCEDURE — 99214 OFFICE O/P EST MOD 30 MIN: CPT | Performed by: NURSE PRACTITIONER

## 2023-03-10 RX ORDER — CARBAMAZEPINE 200 MG/1
200 TABLET, EXTENDED RELEASE ORAL 2 TIMES DAILY
Qty: 60 TABLET | Refills: 2 | Status: SHIPPED | OUTPATIENT
Start: 2023-03-10

## 2023-03-10 RX ORDER — CARBAMAZEPINE 100 MG/1
100 TABLET, EXTENDED RELEASE ORAL 2 TIMES DAILY
COMMUNITY
End: 2023-03-10 | Stop reason: DRUGHIGH

## 2023-03-10 NOTE — PROGRESS NOTES
"Chief Complaint   Patient presents with   • Trigeminal Neuralgia     Was in  on 03/05/2023     • Follow-up       HPI  Melly Cantor is a 62 y.o. female presents for an ED follow-up.  On 3/5/23 she went to  for sharp pain in her face and forehead.  She was diagnosed with trigeminal neuralgia.  She was started on Tegretol in the ED.  She states the symptoms have improved some but the pain is still there.  No headaches.  No recent infections.  Has been checking her glucose regularly.  Has been .  Has removed carbs from her diet.    The following portions of the patient's history were reviewed and updated as appropriate: allergies, current medications, past family history, past medical history, past social history, past surgical history and problem list.    Subjective  Review of Systems   Constitutional: Negative for activity change, appetite change and fatigue.   HENT: Negative for congestion.    Respiratory: Negative for cough and shortness of breath.    Cardiovascular: Negative for chest pain and leg swelling.   Gastrointestinal: Negative for abdominal pain.   Musculoskeletal: Positive for arthralgias.   Neurological: Negative for dizziness, weakness, headache and confusion.   Psychiatric/Behavioral: Negative for behavioral problems and decreased concentration. The patient is nervous/anxious.        Objective  Visit Vitals  /78 (BP Location: Left arm, Patient Position: Sitting)   Pulse 102   Temp 97.1 °F (36.2 °C)   Ht 153.7 cm (60.5\")   Wt 52.6 kg (116 lb)   SpO2 99%   BMI 22.28 kg/m²        Physical Exam  Vitals and nursing note reviewed.   HENT:      Head: Normocephalic.   Eyes:      Pupils: Pupils are equal, round, and reactive to light.   Cardiovascular:      Rate and Rhythm: Normal rate and regular rhythm.      Pulses: Normal pulses.      Heart sounds: Normal heart sounds.   Pulmonary:      Effort: Pulmonary effort is normal.      Breath sounds: Normal breath sounds.   Skin:     General: Skin " is warm and dry.      Capillary Refill: Capillary refill takes less than 2 seconds.   Neurological:      General: No focal deficit present.      Mental Status: She is alert and oriented to person, place, and time.      GCS: GCS eye subscore is 4. GCS verbal subscore is 5. GCS motor subscore is 6.      Motor: Motor function is intact.      Coordination: Coordination is intact.      Gait: Gait is intact.   Psychiatric:         Attention and Perception: Attention normal.         Mood and Affect: Mood normal.         Behavior: Behavior normal.          Procedures       Lab on 02/16/2023   Component Date Value Ref Range Status   • Hemoglobin A1C 02/16/2023 6.00 (H)  4.80 - 5.60 % Final   • WBC 02/16/2023 6.25  3.40 - 10.80 10*3/mm3 Final   • RBC 02/16/2023 4.91  3.77 - 5.28 10*6/mm3 Final   • Hemoglobin 02/16/2023 13.9  12.0 - 15.9 g/dL Final   • Hematocrit 02/16/2023 41.9  34.0 - 46.6 % Final   • MCV 02/16/2023 85.3  79.0 - 97.0 fL Final   • MCH 02/16/2023 28.3  26.6 - 33.0 pg Final   • MCHC 02/16/2023 33.2  31.5 - 35.7 g/dL Final   • RDW 02/16/2023 12.9  12.3 - 15.4 % Final   • RDW-SD 02/16/2023 39.0  37.0 - 54.0 fl Final   • MPV 02/16/2023 10.9  6.0 - 12.0 fL Final   • Platelets 02/16/2023 226  140 - 450 10*3/mm3 Final   • Neutrophil % 02/16/2023 54.8  42.7 - 76.0 % Final   • Lymphocyte % 02/16/2023 33.3  19.6 - 45.3 % Final   • Monocyte % 02/16/2023 5.4  5.0 - 12.0 % Final   • Eosinophil % 02/16/2023 5.3  0.3 - 6.2 % Final   • Basophil % 02/16/2023 1.0  0.0 - 1.5 % Final   • Immature Grans % 02/16/2023 0.2  0.0 - 0.5 % Final   • Neutrophils, Absolute 02/16/2023 3.43  1.70 - 7.00 10*3/mm3 Final   • Lymphocytes, Absolute 02/16/2023 2.08  0.70 - 3.10 10*3/mm3 Final   • Monocytes, Absolute 02/16/2023 0.34  0.10 - 0.90 10*3/mm3 Final   • Eosinophils, Absolute 02/16/2023 0.33  0.00 - 0.40 10*3/mm3 Final   • Basophils, Absolute 02/16/2023 0.06  0.00 - 0.20 10*3/mm3 Final   • Immature Grans, Absolute 02/16/2023 0.01  0.00 -  0.05 10*3/mm3 Final   • nRBC 02/16/2023 0.0  0.0 - 0.2 /100 WBC Final   Office Visit on 02/16/2023   Component Date Value Ref Range Status   • Glucose 02/16/2023 91  65 - 99 mg/dL Final   • BUN 02/16/2023 13  8 - 23 mg/dL Final   • Creatinine 02/16/2023 0.71  0.57 - 1.00 mg/dL Final   • Sodium 02/16/2023 139  136 - 145 mmol/L Final   • Potassium 02/16/2023 3.9  3.5 - 5.2 mmol/L Final   • Chloride 02/16/2023 104  98 - 107 mmol/L Final   • CO2 02/16/2023 26.7  22.0 - 29.0 mmol/L Final   • Calcium 02/16/2023 9.6  8.6 - 10.5 mg/dL Final   • Total Protein 02/16/2023 7.3  6.0 - 8.5 g/dL Final   • Albumin 02/16/2023 4.6  3.5 - 5.2 g/dL Final   • ALT (SGPT) 02/16/2023 9  1 - 33 U/L Final   • AST (SGOT) 02/16/2023 16  1 - 32 U/L Final   • Alkaline Phosphatase 02/16/2023 75  39 - 117 U/L Final   • Total Bilirubin 02/16/2023 0.3  0.0 - 1.2 mg/dL Final   • Globulin 02/16/2023 2.7  gm/dL Final   • A/G Ratio 02/16/2023 1.7  g/dL Final   • BUN/Creatinine Ratio 02/16/2023 18.3  7.0 - 25.0 Final   • Anion Gap 02/16/2023 8.3  5.0 - 15.0 mmol/L Final   • eGFR 02/16/2023 96.3  >60.0 mL/min/1.73 Final   • Total Cholesterol 02/16/2023 213 (H)  0 - 200 mg/dL Final   • Triglycerides 02/16/2023 153 (H)  0 - 150 mg/dL Final   • HDL Cholesterol 02/16/2023 55  40 - 60 mg/dL Final   • LDL Cholesterol  02/16/2023 131 (H)  0 - 100 mg/dL Final   • VLDL Cholesterol 02/16/2023 27  5 - 40 mg/dL Final   • LDL/HDL Ratio 02/16/2023 2.32   Final   • TSH 02/16/2023 1.480  0.270 - 4.200 uIU/mL Final         Assessment and Plan  Diagnoses and all orders for this visit:    1. Trigeminal neuralgia of left side of face (Primary)  -     carBAMazepine XR (TEGretol-XR) 200 MG 12 hr tablet; Take 1 tablet by mouth 2 (Two) Times a Day.  Dispense: 60 tablet; Refill: 2    2. Prediabetes    3. Mixed hyperlipidemia    4. Generalized anxiety disorder    Increase Tegretol to 200mg BID to help with pain  Labs reviewed with pt  Cont low carb diet   Cont red yeast rice  capsules for lipids  Cont Hydroxyzine prn anxiety/hives      Return in about 2 months (around 5/10/2023) for Prediabetes, trigeminal neuralgia.        Federico Garrido, APRN

## 2023-03-24 ENCOUNTER — OFFICE VISIT (OUTPATIENT)
Dept: INTERNAL MEDICINE | Facility: CLINIC | Age: 63
End: 2023-03-24
Payer: MEDICAID

## 2023-03-24 ENCOUNTER — HOSPITAL ENCOUNTER (OUTPATIENT)
Dept: ULTRASOUND IMAGING | Facility: HOSPITAL | Age: 63
Discharge: HOME | End: 2023-03-24
Admitting: NURSE PRACTITIONER
Payer: MEDICAID

## 2023-03-24 VITALS
WEIGHT: 116 LBS | HEART RATE: 68 BPM | OXYGEN SATURATION: 99 % | SYSTOLIC BLOOD PRESSURE: 120 MMHG | HEIGHT: 61 IN | TEMPERATURE: 98.3 F | BODY MASS INDEX: 21.9 KG/M2 | DIASTOLIC BLOOD PRESSURE: 78 MMHG

## 2023-03-24 DIAGNOSIS — R59.0 ENLARGED LYMPH NODE IN NECK: ICD-10-CM

## 2023-03-24 DIAGNOSIS — R59.0 ENLARGED LYMPH NODE IN NECK: Primary | ICD-10-CM

## 2023-03-24 PROCEDURE — 1160F RVW MEDS BY RX/DR IN RCRD: CPT | Performed by: NURSE PRACTITIONER

## 2023-03-24 PROCEDURE — 99213 OFFICE O/P EST LOW 20 MIN: CPT | Performed by: NURSE PRACTITIONER

## 2023-03-24 PROCEDURE — 76536 US EXAM OF HEAD AND NECK: CPT

## 2023-03-24 PROCEDURE — 1159F MED LIST DOCD IN RCRD: CPT | Performed by: NURSE PRACTITIONER

## 2023-03-24 RX ORDER — IBUPROFEN 600 MG/1
600 TABLET ORAL EVERY 8 HOURS PRN
Qty: 60 TABLET | Refills: 2 | Status: SHIPPED | OUTPATIENT
Start: 2023-03-24

## 2023-03-24 NOTE — PROGRESS NOTES
"Chief Complaint   Patient presents with   • Mass     On neck.         HPI  Melly Cantor is a 62 y.o. female presents for an enlarged gland on the left side of her neck.  She first noticed this yesterday.  She denies any other symptoms.  No fever, body aches, sore throat, or earache.  She is having a lot of anxiety over the gland being swollen.  She wants it evaluated ASAP.    The following portions of the patient's history were reviewed and updated as appropriate: allergies, current medications, past family history, past medical history, past social history, past surgical history and problem list.    Subjective  Review of Systems   Constitutional: Negative for activity change, appetite change, chills, fatigue and fever.   HENT: Positive for swollen glands. Negative for congestion, ear pain and sore throat.    Respiratory: Negative for cough and shortness of breath.    Cardiovascular: Negative for chest pain and leg swelling.   Gastrointestinal: Negative for abdominal pain.   Musculoskeletal: Negative for myalgias.   Neurological: Negative for dizziness, weakness and confusion.   Psychiatric/Behavioral: Negative for behavioral problems and decreased concentration.       Objective  Visit Vitals  /78 (BP Location: Left arm, Patient Position: Sitting)   Pulse 68   Temp 98.3 °F (36.8 °C)   Ht 154.9 cm (61\")   Wt 52.6 kg (116 lb)   SpO2 99%   BMI 21.92 kg/m²        Physical Exam  Vitals and nursing note reviewed.   HENT:      Head: Normocephalic.   Eyes:      Pupils: Pupils are equal, round, and reactive to light.   Neck:     Pulmonary:      Effort: Pulmonary effort is normal.   Skin:     General: Skin is warm and dry.      Capillary Refill: Capillary refill takes less than 2 seconds.   Neurological:      General: No focal deficit present.      Mental Status: She is alert and oriented to person, place, and time.      Gait: Gait is intact.   Psychiatric:         Attention and Perception: Attention normal.         " Mood and Affect: Mood normal.         Behavior: Behavior normal.           Procedures     Assessment and Plan  Diagnoses and all orders for this visit:    1. Enlarged lymph node in neck (Primary)  -     US Head Neck Soft Tissue; Future  -     ibuprofen (ADVIL,MOTRIN) 600 MG tablet; Take 1 tablet by mouth Every 8 (Eight) Hours As Needed for Moderate Pain (Take with food).  Dispense: 60 tablet; Refill: 2    Likely enlarged lymph node, d/w pt this would likely resolve on it's own in a week or so  Recommend Motrin to help alleviate pain/swelling  Pt requesting U/S asap     Return for Next scheduled follow up.        Federico Garrido, APRN

## 2023-03-27 DIAGNOSIS — R59.0 ENLARGED LYMPH NODE IN NECK: Primary | ICD-10-CM

## 2023-04-04 ENCOUNTER — TELEPHONE (OUTPATIENT)
Dept: INTERNAL MEDICINE | Facility: CLINIC | Age: 63
End: 2023-04-04
Payer: MEDICAID

## 2023-04-04 NOTE — TELEPHONE ENCOUNTER
Pt called the office and asked to speak to Federico regarding the Carbamezapine, she said her throat has been hurting more than usual, like someone hit her in it and she believes its due to the mediation. She said her lymphnodes are also still very swollen, please advise.

## 2023-04-07 NOTE — TELEPHONE ENCOUNTER
Pt called the office and stated she doesn't have a neurologist, I looked in her chart and there were no referrals. The pt was informed I did fax the referral to general surgery, but I'll fax it again and she said she'd call the  general surgery office again this afternoon. I guess all the pt is a referral to a neurologist

## 2023-04-10 ENCOUNTER — HOSPITAL ENCOUNTER (OUTPATIENT)
Dept: CT IMAGING | Facility: HOSPITAL | Age: 63
Discharge: HOME | End: 2023-04-10
Payer: MEDICAID

## 2023-04-10 ENCOUNTER — TELEPHONE (OUTPATIENT)
Dept: INTERNAL MEDICINE | Facility: CLINIC | Age: 63
End: 2023-04-10
Payer: MEDICAID

## 2023-04-10 ENCOUNTER — HOSPITAL ENCOUNTER (OUTPATIENT)
Dept: MRI IMAGING | Facility: HOSPITAL | Age: 63
Discharge: HOME | End: 2023-04-10
Payer: MEDICAID

## 2023-04-10 DIAGNOSIS — M89.8X8 STERNAL MASS: ICD-10-CM

## 2023-04-10 LAB — CREAT BLDA-MCNC: 0.6 MG/DL (ref 0.6–1.3)

## 2023-04-10 PROCEDURE — A9577 INJ MULTIHANCE: HCPCS | Performed by: NURSE PRACTITIONER

## 2023-04-10 PROCEDURE — 25510000001 IOPAMIDOL 61 % SOLUTION: Performed by: NURSE PRACTITIONER

## 2023-04-10 PROCEDURE — 0 GADOBENATE DIMEGLUMINE 529 MG/ML SOLUTION: Performed by: NURSE PRACTITIONER

## 2023-04-10 PROCEDURE — 71260 CT THORAX DX C+: CPT

## 2023-04-10 PROCEDURE — 71552 MRI CHEST W/O & W/DYE: CPT

## 2023-04-10 PROCEDURE — 82565 ASSAY OF CREATININE: CPT

## 2023-04-10 RX ADMIN — IOPAMIDOL 50 ML: 612 INJECTION, SOLUTION INTRAVENOUS at 14:15

## 2023-04-10 RX ADMIN — GADOBENATE DIMEGLUMINE 10 ML: 529 INJECTION, SOLUTION INTRAVENOUS at 14:14

## 2023-04-10 NOTE — TELEPHONE ENCOUNTER
Pt called the office and stated she needs a refill of carbamazepine 100 mg, not 200 b/c it was too strong called into the [facility] on [address], please advise

## 2023-04-11 ENCOUNTER — TELEPHONE (OUTPATIENT)
Dept: INTERNAL MEDICINE | Facility: CLINIC | Age: 63
End: 2023-04-11
Payer: MEDICAID

## 2023-04-11 DIAGNOSIS — G50.0 TRIGEMINAL NEURALGIA OF LEFT SIDE OF FACE: ICD-10-CM

## 2023-04-11 RX ORDER — CARBAMAZEPINE 100 MG/1
100 TABLET, EXTENDED RELEASE ORAL 2 TIMES DAILY
Qty: 30 TABLET | Refills: 5 | Status: SHIPPED | OUTPATIENT
Start: 2023-04-11

## 2023-04-12 ENCOUNTER — TELEPHONE (OUTPATIENT)
Dept: INTERNAL MEDICINE | Facility: CLINIC | Age: 63
End: 2023-04-12
Payer: MEDICAID

## 2023-04-12 NOTE — TELEPHONE ENCOUNTER
Caller: Melly Cantor    Relationship: Self    Best call back number: [phone]    What specialty or service is being requested: ENDOCRINOLOGY     What is the provider, practice or medical service name: DR DONAL MAYER      What is the office phone number: [phone]  FAX: [phone]    Any additional details:

## 2023-04-12 NOTE — TELEPHONE ENCOUNTER
Caller: Melly Cantor    Relationship: Self    Best call back number: [phone]    Caller requesting test results: YES    What test was performed: MRI AND CT SCAN    Additional notes: PATIENT WOULD LIKE A CALL ASAP

## 2023-04-14 ENCOUNTER — TELEPHONE (OUTPATIENT)
Dept: INTERNAL MEDICINE | Facility: CLINIC | Age: 63
End: 2023-04-14
Payer: MEDICAID

## 2023-04-17 ENCOUNTER — TELEPHONE (OUTPATIENT)
Dept: INTERNAL MEDICINE | Facility: CLINIC | Age: 63
End: 2023-04-17
Payer: MEDICAID

## 2023-04-17 DIAGNOSIS — G50.0 TRIGEMINAL NEURALGIA OF LEFT SIDE OF FACE: Primary | ICD-10-CM

## 2023-04-17 DIAGNOSIS — R59.9 ENLARGED LYMPH NODES: Primary | ICD-10-CM

## 2023-04-17 NOTE — PROGRESS NOTES
[facility] Cardiology  Consultation note    Subjective:     Patient ID: Melly Cantor is a 62 y.o. female from [place], [place].  Referring provider: RENETTA Hernandez     Reason for consultation: Family history of heart disease     Problem List:  1. Coronary calcification   a. Moderate coronary artery calcification on CT scan of chest 4/10/2023  2. Hyperlipidemia  3. Pre-diabetes  4. Anxiety  5. Trigeminal neuralgia    Allergies   Allergen Reactions   • Amoxicillin-Pot Clavulanate Itching, Nausea Only, Other (See Comments), Rash and GI Intolerance   • Latex Itching   • Compazine [Prochlorperazine] Unknown - High Severity   • Oxycodone-Acetaminophen Nausea Only   • Penicillins Unknown - High Severity   • Phenergan [Promethazine] Unknown - High Severity   • Sulfamethoxazole-Trimethoprim Nausea Only   • Zithromax [Azithromycin] Unknown - High Severity       Current Outpatient Medications:   •  carBAMazepine XR (TEGretol-XR) 100 MG 12 hr tablet, Take 1 tablet by mouth 2 (Two) Times a Day., Disp: 30 tablet, Rfl: 5  •  dorzolamide (TRUSOPT) 2 % ophthalmic solution, Administer 1 drop to both eyes 4 (Four) Times a Day., Disp: 10 mL, Rfl: 2  •  glucose blood (Glucose Meter Test) test strip, Check blood sugars once daily, Disp: 100 each, Rfl: 5  •  glucose monitor monitoring kit, 1 each As Needed (Check blood sugars 3 times daily as needed.)., Disp: 1 each, Rfl: 0  •  hydrOXYzine (ATARAX) 25 MG tablet, Take 1 tablet by mouth 3 (Three) Times a Day As Needed (Hives)., Disp: 90 tablet, Rfl: 3  •  ibuprofen (ADVIL,MOTRIN) 600 MG tablet, Take 1 tablet by mouth Every 8 (Eight) Hours As Needed for Moderate Pain (Take with food)., Disp: 60 tablet, Rfl: 2  •  Lancets misc, Check blood sugars 3 times daily as needed., Disp: 100 each, Rfl: 5  •  levocetirizine (XYZAL) 5 MG tablet, Take 1 tablet by mouth every night at bedtime., Disp: , Rfl:   •  Loratadine 10 MG capsule, loratadine 10 mg capsule  Take  1 capsule as needed by oral route., Disp: , Rfl:   •  travoprost, BAK free, (TRAVATAN) 0.004 % solution ophthalmic solution, Administer 1 drop to both eyes Daily. in affected eye(s), Disp: 2.5 mL, Rfl: 2    HPI:  Melly Cantor is a 62 y.o. female who presents today with a history of hyperlipidemia and pre-diabetes in consultation for family history of heart disease in referral from RENETTA Hernandez. Her father had a heart attack at 72 y.o. and then had a stroke at 88 y.o. and passed away from a heart attack a year later. Her brother had a stroke at 54 y/o and has diabetes, hypertension and high cholesterol. Unknown as to why her brother had a stroke. Her mom had high cholesterol and diabetes and passed away from a heart at 63 y.o. and had no heart attacks prior. Her sisters have high cholesterol and hypertension.     She denies any personal history of stroke, heart attack or having a prior cardiac work-up. She has recently moved here from [place] and is wanting to establish care here with a cardiologist. She has never seen a cardiologist in the past. She states she has been on a statin in the past but does not like the idea of being on a statin so she stopped taking it. She is unsure which statin she was on in the past. Denies ever being a smoker. She did have prior imaging for work-up of a lump on her neck and remembers her PCP telling her she has calcification in her carotid arteries but never had a work-up for this. She walks daily for at least 30 minutes and denies any associated symptoms when she walks. She denies any current or history of chest pain, palpitations, dizziness, lightheadedness, presyncope, syncope, edema, abdominal discomfort, nausea, vomiting, numbness, tingling or arm/jaw neck pain.    Cardiac Risk Factors: family history of CAD, hyperlipidemia    The following portions of the patient's history were reviewed and updated as appropriate: allergies, current medications and problem  list.    Past Surgical History:   Procedure Laterality Date   •  SECTION     • TUBAL ABDOMINAL LIGATION       Social History     Socioeconomic History   • Marital status:    Tobacco Use   • Smoking status: Never   • Smokeless tobacco: Never   Vaping Use   • Vaping Use: Never used   Substance and Sexual Activity   • Alcohol use: Never   • Drug use: Never   • Sexual activity: Defer     Family History   Problem Relation Age of Onset   • Hyperlipidemia Mother    • Hypertension Mother    • Diabetes Mother    • Arthritis Mother    • Heart attack Mother    • Stroke Father    • Kidney disease Father    • Hyperlipidemia Father    • Hypertension Father    • Heart attack Father    • Diabetes Father    • Arthritis Father    • Kidney cancer Father    • Thyroid disease Sister    • Hyperlipidemia Sister    • Diabetes Sister    • Thyroid disease Brother    • Stroke Brother    • Hyperlipidemia Brother    • Hypertension Brother    • Diabetes Brother        Review of Systems: A 12 point ROS negative except for what is listed in HPI.       Objective:     Vitals:    23 0948   BP: 116/68   Pulse: 91   SpO2: 97%     Vitals have been reviewed.    Physical Exam:     General: Well-developed well-nourished, no acute distress  HEENT: Pupils equal round and reactive to light.  Oropharynx is clear and moist.  CV: Regular rate and rhythm with no murmur gallop or rub.  Nondisplaced focal PMI. Left carotid bruit.  Resp: The lungs are clear bilaterally with no rales or wheezes.  Equal expansion is noted.  GI: The abdomen is soft and nontender with normal bowel sounds throughout.  No hepatosplenomegaly or midline bruits are present.  : deferred  Musculoskeletal/extremities: No gross joint deformities are noted, no edema  Skin: Warm and dry  Neurologic: Nonfocal  Psychiatric: Normal mood and affect      Diagnostic Data (reviewed):      Lab Results   Component Value Date    WBC 6.25 2023    HGB 13.9 2023    HCT 41.9  02/16/2023    MCV 85.3 02/16/2023     02/16/2023       Lab Results   Component Value Date    GLUCOSE 91 02/16/2023    BUN 13 02/16/2023    CREATININE 0.60 04/10/2023    EGFR 96.3 02/16/2023    BCR 18.3 02/16/2023    K 3.9 02/16/2023    CO2 26.7 02/16/2023    CALCIUM 9.6 02/16/2023    ALBUMIN 4.6 02/16/2023    BILITOT 0.3 02/16/2023    AST 16 02/16/2023    ALT 9 02/16/2023       Lab Results   Component Value Date    CHOL 213 (H) 02/16/2023    TRIG 153 (H) 02/16/2023    HDL 55 02/16/2023     (H) 02/16/2023       Lab Results   Component Value Date    HGBA1C 6.00 (H) 02/16/2023       Lab Results   Component Value Date    TSH 1.480 02/16/2023     CT chest with contrast (4/10/2023): coronary calcifications noted.    ECG 12 Lead    Date/Time: 4/19/2023 10:22 AM  Performed by: Tootie Rome PA-C  Authorized by: Tootie Rome PA-C   Comparison: not compared with previous ECG   Previous ECG: no previous ECG available  Rhythm: sinus rhythm  Rate: normal  BPM: 72    Clinical impression: normal ECG          Advance Care Planning   ACP discussion was declined by the patient. Patient does not have an advance directive, declines further assistance.         Assessment:       ICD-10-CM ICD-9-CM   1. Family history of coronary artery disease  Z82.49 V17.3   2. Coronary artery calcification  I25.10 414.00    I25.84 414.4   3. Mixed hyperlipidemia  E78.2 272.2   4. Pre-diabetes  R73.03 790.29   5. Left carotid bruit  R09.89 785.9            Plan:     1. Carotid duplex U/S due to carotid bruit heard on left.  2. CT calcium score to further quantitate of coronary artery calcification.  3. Encouraged a heart healthy lifestyle including a heart healthy diet and at least 30 minutes. of exercise 4-5 times a week. Limit salt intake.  4. Encouraged her to think about being on a statin in the future for known coronary artery disease.  5. Begin aspirin 81 mg daily for anti-platelet coverage  6. Follow-up in 1 month, sooner as  needed.      Tootie Rome PA-C functioning as a scribe for Suki Gallagher MD, FACC.    I Suki Gallagher MD personally performed the services described in this documentation as scribed by the above individual in my presence, and it is both accurate and complete.    Suki Gallagher MD, FACC

## 2023-04-17 NOTE — TELEPHONE ENCOUNTER
Pt called and stated that she would like to see Dr. Merari Perez with [facility] for her referral to neurology. Pt stated that she would like to speak to jason today about sending her information to that office. Pt stated that the phone number is [phone], and the fax number is [phone].

## 2023-04-18 ENCOUNTER — TELEPHONE (OUTPATIENT)
Dept: INTERNAL MEDICINE | Facility: CLINIC | Age: 63
End: 2023-04-18
Payer: MEDICAID

## 2023-04-18 NOTE — TELEPHONE ENCOUNTER
"mge end [facility] sent a message stating \" \"We can see her for prediabetes but not for enlarged lymph nodes- that would be another specialty   Thanks, [facility]\"  Just wanted to give you a heads up!       "

## 2023-04-19 ENCOUNTER — OFFICE VISIT (OUTPATIENT)
Dept: CARDIOLOGY | Facility: CLINIC | Age: 63
End: 2023-04-19
Payer: MEDICAID

## 2023-04-19 VITALS
SYSTOLIC BLOOD PRESSURE: 116 MMHG | HEART RATE: 91 BPM | BODY MASS INDEX: 20.8 KG/M2 | DIASTOLIC BLOOD PRESSURE: 68 MMHG | HEIGHT: 62 IN | OXYGEN SATURATION: 97 % | WEIGHT: 113 LBS

## 2023-04-19 DIAGNOSIS — I25.10 CORONARY ARTERY CALCIFICATION: ICD-10-CM

## 2023-04-19 DIAGNOSIS — E78.2 MIXED HYPERLIPIDEMIA: ICD-10-CM

## 2023-04-19 DIAGNOSIS — R09.89 LEFT CAROTID BRUIT: ICD-10-CM

## 2023-04-19 DIAGNOSIS — I25.84 CORONARY ARTERY CALCIFICATION: ICD-10-CM

## 2023-04-19 DIAGNOSIS — Z82.49 FAMILY HISTORY OF CORONARY ARTERY DISEASE: Primary | ICD-10-CM

## 2023-04-19 DIAGNOSIS — R73.03 PRE-DIABETES: ICD-10-CM

## 2023-04-19 NOTE — TELEPHONE ENCOUNTER
ADVISED THE PT THAT MARIA DEL CARMEN, HER PROVIDER THOUGHT IT WAS BEST TO COMPLETE THE BIOPSY FIRST B/C THAT'S WHAT WAS DISCUSSED AT THEIR VISIT. THE PT DECLINED THE BIOPSY FIRST WITH  GENERAL SURGERY, SAID SHE'D  DO A CONSULTATION WITH GENERAL SURGEON, BUT THAT WAS IT, NO BIOPSY UNTIL SHE SAW A ENT SPECIALIST. I TOLD THE PT THAT IS NOT WHAT MARIA DEL CARMEN RECOMMENDS, BUT SHE WAS SURE ABOUT GETTING AN ENT DR. I TOLD THE PT TO STILL MAKE THE APPT WITH THE GENERAL SURGEON AND KEEP IT AND WE CAN GO FROM THERE. THE REASON SHE DOESN'T WANT GENERAL SURGERY TO DO IT IS B/C ENT SPECIALIZES IN THE THROAT. I TOLD THE PT I WOULD TELL MARIA DEL CARMEN , BUT I'M PRETTY SURE SHE IS NOT GOING TO CALL IN AN ENT REFERRAL FOR A BIOPSY.  CHARLIE

## 2023-04-19 NOTE — TELEPHONE ENCOUNTER
PT CALLED THE OFFICE AND SAID SHE WOULD LIKE TO SEE A MARIAN RAZO WITH ENT SPECIALIST FOR THE NODULES IN HER THROAT, PLEASE ADVISE

## 2023-04-27 ENCOUNTER — TELEPHONE (OUTPATIENT)
Dept: INTERNAL MEDICINE | Facility: CLINIC | Age: 63
End: 2023-04-27
Payer: MEDICAID

## 2023-04-27 NOTE — TELEPHONE ENCOUNTER
Pt called back and asked to see an ENT provider. Informed the pt tirso will not call in a referral to ENT b/c she would like for her to complete biopsy first, which is what the pt originally asked for. I  Told the pt tirso will not order it and the pt said she needs to see an ENT specialist before they cut her open, but I told her that not what her provider recommends. Just a FYI in case the pt calls back

## 2023-04-28 DIAGNOSIS — R59.9 ENLARGED LYMPH NODES: Primary | ICD-10-CM

## 2023-05-03 ENCOUNTER — HOSPITAL ENCOUNTER (OUTPATIENT)
Dept: CARDIOLOGY | Facility: HOSPITAL | Age: 63
Discharge: HOME | End: 2023-05-03
Payer: MEDICAID

## 2023-05-03 VITALS — HEIGHT: 61 IN | WEIGHT: 113.1 LBS | BODY MASS INDEX: 21.35 KG/M2

## 2023-05-03 DIAGNOSIS — E78.2 MIXED HYPERLIPIDEMIA: ICD-10-CM

## 2023-05-03 LAB
BH CV XLRA MEAS LEFT DIST CCA EDV: 36.4 CM/SEC
BH CV XLRA MEAS LEFT DIST CCA PSV: 98.5 CM/SEC
BH CV XLRA MEAS LEFT DIST ICA EDV: 40.1 CM/SEC
BH CV XLRA MEAS LEFT DIST ICA PSV: 120 CM/SEC
BH CV XLRA MEAS LEFT ICA/CCA RATIO: 0.86
BH CV XLRA MEAS LEFT MID CCA EDV: 27.6 CM/SEC
BH CV XLRA MEAS LEFT MID CCA PSV: 92 CM/SEC
BH CV XLRA MEAS LEFT MID ICA EDV: 28.1 CM/SEC
BH CV XLRA MEAS LEFT MID ICA PSV: 79.2 CM/SEC
BH CV XLRA MEAS LEFT PROX CCA EDV: 22.9 CM/SEC
BH CV XLRA MEAS LEFT PROX CCA PSV: 99.7 CM/SEC
BH CV XLRA MEAS LEFT PROX ECA EDV: 42.4 CM/SEC
BH CV XLRA MEAS LEFT PROX ECA PSV: 250 CM/SEC
BH CV XLRA MEAS LEFT PROX ICA EDV: 18.2 CM/SEC
BH CV XLRA MEAS LEFT PROX ICA PSV: 53.4 CM/SEC
BH CV XLRA MEAS LEFT PROX SCLA PSV: 159 CM/SEC
BH CV XLRA MEAS LEFT VERTEBRAL A EDV: 20.8 CM/SEC
BH CV XLRA MEAS LEFT VERTEBRAL A PSV: 64 CM/SEC
BH CV XLRA MEAS RIGHT DIST CCA EDV: 22.3 CM/SEC
BH CV XLRA MEAS RIGHT DIST CCA PSV: 93.2 CM/SEC
BH CV XLRA MEAS RIGHT DIST ICA EDV: 30 CM/SEC
BH CV XLRA MEAS RIGHT DIST ICA PSV: 81.5 CM/SEC
BH CV XLRA MEAS RIGHT ICA/CCA RATIO: 1.02
BH CV XLRA MEAS RIGHT MID CCA EDV: 29.3 CM/SEC
BH CV XLRA MEAS RIGHT MID CCA PSV: 86.2 CM/SEC
BH CV XLRA MEAS RIGHT MID ICA EDV: 32.2 CM/SEC
BH CV XLRA MEAS RIGHT MID ICA PSV: 87.9 CM/SEC
BH CV XLRA MEAS RIGHT PROX CCA EDV: 17.6 CM/SEC
BH CV XLRA MEAS RIGHT PROX CCA PSV: 79.7 CM/SEC
BH CV XLRA MEAS RIGHT PROX ECA EDV: 25.1 CM/SEC
BH CV XLRA MEAS RIGHT PROX ECA PSV: 94.3 CM/SEC
BH CV XLRA MEAS RIGHT PROX ICA EDV: 32.2 CM/SEC
BH CV XLRA MEAS RIGHT PROX ICA PSV: 78.6 CM/SEC
BH CV XLRA MEAS RIGHT PROX SCLA PSV: 102 CM/SEC
BH CV XLRA MEAS RIGHT VERTEBRAL A EDV: 18.1 CM/SEC
BH CV XLRA MEAS RIGHT VERTEBRAL A PSV: 56.6 CM/SEC
LEFT ARM BP: NORMAL MMHG
MAXIMAL PREDICTED HEART RATE: 158 BPM
RIGHT ARM BP: NORMAL MMHG
STRESS TARGET HR: 134 BPM

## 2023-05-03 PROCEDURE — 93880 EXTRACRANIAL BILAT STUDY: CPT | Performed by: INTERNAL MEDICINE

## 2023-05-03 PROCEDURE — 93880 EXTRACRANIAL BILAT STUDY: CPT

## 2023-05-04 ENCOUNTER — TELEPHONE (OUTPATIENT)
Dept: CARDIOLOGY | Facility: CLINIC | Age: 63
End: 2023-05-04
Payer: MEDICAID

## 2023-05-04 NOTE — TELEPHONE ENCOUNTER
Called and spoke with patient to review the results from her recent bilateral carotid duplex u/s. Informed her that her results are within normal limits revealing mild carotid atherosclerosis with her right and left internal carotid arteries having less than 55=% stenosis. Encouraged her to continue in her positive lifestyle changes. She happily reports that she has lost 9 pounds since we last saw her. Patient will schedule a CT Calcium Score at the [facility] and have to results sent to us.    Tootie Rome PA-C

## 2023-05-05 ENCOUNTER — OFFICE VISIT (OUTPATIENT)
Dept: INTERNAL MEDICINE | Facility: CLINIC | Age: 63
End: 2023-05-05
Payer: MEDICAID

## 2023-05-05 ENCOUNTER — LAB (OUTPATIENT)
Dept: INTERNAL MEDICINE | Facility: CLINIC | Age: 63
End: 2023-05-05
Payer: MEDICAID

## 2023-05-05 VITALS
DIASTOLIC BLOOD PRESSURE: 70 MMHG | HEIGHT: 61 IN | RESPIRATION RATE: 16 BRPM | TEMPERATURE: 98 F | OXYGEN SATURATION: 97 % | SYSTOLIC BLOOD PRESSURE: 120 MMHG | BODY MASS INDEX: 20.99 KG/M2 | HEART RATE: 78 BPM | WEIGHT: 111.2 LBS

## 2023-05-05 DIAGNOSIS — E55.9 VITAMIN D DEFICIENCY: ICD-10-CM

## 2023-05-05 DIAGNOSIS — R73.09 ELEVATED GLUCOSE: ICD-10-CM

## 2023-05-05 DIAGNOSIS — R22.1 MASS OF LEFT SIDE OF NECK: ICD-10-CM

## 2023-05-05 DIAGNOSIS — G50.0 TRIGEMINAL NEURALGIA: ICD-10-CM

## 2023-05-05 DIAGNOSIS — F41.9 ANXIETY: ICD-10-CM

## 2023-05-05 DIAGNOSIS — I25.84 CORONARY ARTERY CALCIFICATION: ICD-10-CM

## 2023-05-05 DIAGNOSIS — R73.09 ELEVATED GLUCOSE: Primary | ICD-10-CM

## 2023-05-05 DIAGNOSIS — E78.2 MIXED HYPERLIPIDEMIA: ICD-10-CM

## 2023-05-05 DIAGNOSIS — I25.10 CORONARY ARTERY CALCIFICATION: ICD-10-CM

## 2023-05-05 LAB
ALBUMIN SERPL-MCNC: 4.5 G/DL (ref 3.5–5.2)
ALBUMIN/GLOB SERPL: 1.8 G/DL
ALP SERPL-CCNC: 77 U/L (ref 39–117)
ALT SERPL W P-5'-P-CCNC: 11 U/L (ref 1–33)
ANION GAP SERPL CALCULATED.3IONS-SCNC: 9.6 MMOL/L (ref 5–15)
AST SERPL-CCNC: 17 U/L (ref 1–32)
BASOPHILS # BLD AUTO: 0.07 10*3/MM3 (ref 0–0.2)
BASOPHILS NFR BLD AUTO: 1.7 % (ref 0–1.5)
BILIRUB SERPL-MCNC: 0.4 MG/DL (ref 0–1.2)
BUN SERPL-MCNC: 13 MG/DL (ref 8–23)
BUN/CREAT SERPL: 18.3 (ref 7–25)
CALCIUM SPEC-SCNC: 9.7 MG/DL (ref 8.6–10.5)
CHLORIDE SERPL-SCNC: 104 MMOL/L (ref 98–107)
CHOLEST SERPL-MCNC: 191 MG/DL (ref 0–200)
CO2 SERPL-SCNC: 26.4 MMOL/L (ref 22–29)
CREAT SERPL-MCNC: 0.71 MG/DL (ref 0.57–1)
DEPRECATED RDW RBC AUTO: 39.7 FL (ref 37–54)
EGFRCR SERPLBLD CKD-EPI 2021: 96.3 ML/MIN/1.73
EOSINOPHIL # BLD AUTO: 0.07 10*3/MM3 (ref 0–0.4)
EOSINOPHIL NFR BLD AUTO: 1.7 % (ref 0.3–6.2)
ERYTHROCYTE [DISTWIDTH] IN BLOOD BY AUTOMATED COUNT: 13 % (ref 12.3–15.4)
GLOBULIN UR ELPH-MCNC: 2.5 GM/DL
GLUCOSE SERPL-MCNC: 108 MG/DL (ref 65–99)
HBA1C MFR BLD: 6 % (ref 4.8–5.6)
HCT VFR BLD AUTO: 39.7 % (ref 34–46.6)
HDLC SERPL-MCNC: 51 MG/DL (ref 40–60)
HGB BLD-MCNC: 13.1 G/DL (ref 12–15.9)
IMM GRANULOCYTES # BLD AUTO: 0.01 10*3/MM3 (ref 0–0.05)
IMM GRANULOCYTES NFR BLD AUTO: 0.2 % (ref 0–0.5)
LDLC SERPL CALC-MCNC: 127 MG/DL (ref 0–100)
LDLC/HDLC SERPL: 2.47 {RATIO}
LYMPHOCYTES # BLD AUTO: 1.38 10*3/MM3 (ref 0.7–3.1)
LYMPHOCYTES NFR BLD AUTO: 34.1 % (ref 19.6–45.3)
MCH RBC QN AUTO: 27.8 PG (ref 26.6–33)
MCHC RBC AUTO-ENTMCNC: 33 G/DL (ref 31.5–35.7)
MCV RBC AUTO: 84.1 FL (ref 79–97)
MONOCYTES # BLD AUTO: 0.34 10*3/MM3 (ref 0.1–0.9)
MONOCYTES NFR BLD AUTO: 8.4 % (ref 5–12)
NEUTROPHILS NFR BLD AUTO: 2.18 10*3/MM3 (ref 1.7–7)
NEUTROPHILS NFR BLD AUTO: 53.9 % (ref 42.7–76)
NRBC BLD AUTO-RTO: 0 /100 WBC (ref 0–0.2)
PLATELET # BLD AUTO: 210 10*3/MM3 (ref 140–450)
PMV BLD AUTO: 10.8 FL (ref 6–12)
POTASSIUM SERPL-SCNC: 4.1 MMOL/L (ref 3.5–5.2)
PROT SERPL-MCNC: 7 G/DL (ref 6–8.5)
RBC # BLD AUTO: 4.72 10*6/MM3 (ref 3.77–5.28)
SODIUM SERPL-SCNC: 140 MMOL/L (ref 136–145)
TRIGL SERPL-MCNC: 69 MG/DL (ref 0–150)
TSH SERPL DL<=0.05 MIU/L-ACNC: 1.28 UIU/ML (ref 0.27–4.2)
VLDLC SERPL-MCNC: 13 MG/DL (ref 5–40)
WBC NRBC COR # BLD: 4.05 10*3/MM3 (ref 3.4–10.8)

## 2023-05-05 PROCEDURE — 82306 VITAMIN D 25 HYDROXY: CPT | Performed by: FAMILY MEDICINE

## 2023-05-05 PROCEDURE — 36415 COLL VENOUS BLD VENIPUNCTURE: CPT | Performed by: FAMILY MEDICINE

## 2023-05-05 PROCEDURE — 80061 LIPID PANEL: CPT | Performed by: FAMILY MEDICINE

## 2023-05-05 PROCEDURE — 99214 OFFICE O/P EST MOD 30 MIN: CPT | Performed by: FAMILY MEDICINE

## 2023-05-05 PROCEDURE — 80050 GENERAL HEALTH PANEL: CPT | Performed by: FAMILY MEDICINE

## 2023-05-05 PROCEDURE — 1160F RVW MEDS BY RX/DR IN RCRD: CPT | Performed by: FAMILY MEDICINE

## 2023-05-05 PROCEDURE — 1159F MED LIST DOCD IN RCRD: CPT | Performed by: FAMILY MEDICINE

## 2023-05-05 PROCEDURE — 83036 HEMOGLOBIN GLYCOSYLATED A1C: CPT | Performed by: FAMILY MEDICINE

## 2023-05-05 NOTE — PROGRESS NOTES
Office Note     Name: Melly Cantor    : 1960     MRN: [id number]     Chief Complaint  ENT REFERRAL (ENT REFERRAL TO DR. MARIAN TAYLOR-DUE TO LYMPHNODES BEING SWOLLEN B/C OF SIDE EFFECTS FROM CARBAMAZEPHINE-seen [facility] as PCP but wanted to see us and will switch to our office permanently ) and Establish Care    Subjective     History of Present Illness:  Melly Cantor is a 62 y.o. female who presents today for establishing care.  She has a history of trigeminal neuralgia and is on medication for this and she had some swollen lymph nodes on the left side of her neck and these were first found as a neck mass and she had a CT scan that showed this area and then it showed a possible joint effusion in her left SC joint and no further work-up was recommended an MRI showed this as well.  She would like an ENT referral to a specific doctor to check the left neck mass.  She is up-to-date with her Pap and mammogram and colonoscopy and she is not a smoker and she has had coronary artery calcifications that are being worked up by cardiology and she has had hyperlipidemia and elevated blood sugar that she controls with diet and she has had anxiety that is stable.  She has been working on her diet and being restrictive with what she is eating and trying to be more active and she would like to have her labs rechecked.  Past medical history: As above  Social history: Negative tobacco, negative EtOH, she lives at home with her daughter and she has 3 adult children and currently does not work.    Review of Systems   Respiratory: Negative for cough, shortness of breath and wheezing.    Cardiovascular: Negative for chest pain and palpitations.   Gastrointestinal: Negative for blood in stool, diarrhea and vomiting.   Genitourinary: Negative for dysuria, flank pain and genital sores.       Objective     Vital Signs  /70 (Cuff Size: Adult)   Pulse 78   Temp 98 °F (36.7 °C) (Infrared)   Resp 16   Ht 153.7 cm  "(60.5\")   Wt 50.4 kg (111 lb 3.2 oz)   SpO2 97%   BMI 21.36 kg/m²   Estimated body mass index is 21.36 kg/m² as calculated from the following:    Height as of this encounter: 153.7 cm (60.5\").    Weight as of this encounter: 50.4 kg (111 lb 3.2 oz).    BMI is within normal parameters. No other follow-up for BMI required.      Physical Exam  Vitals and nursing note reviewed.   HENT:      Head: Normocephalic and atraumatic.   Neck:      Vascular: No carotid bruit.   Cardiovascular:      Rate and Rhythm: Normal rate and regular rhythm.      Heart sounds: Normal heart sounds. No murmur heard.    No gallop.   Pulmonary:      Effort: Pulmonary effort is normal. No respiratory distress.      Breath sounds: No stridor. No wheezing, rhonchi or rales.   Musculoskeletal:      Cervical back: Normal range of motion and neck supple.      Right lower leg: No edema.      Left lower leg: No edema.   Lymphadenopathy:      Cervical: No cervical adenopathy.   Neurological:      Mental Status: She is alert.                 Assessment and Plan     Diagnoses and all orders for this visit:    1. Elevated glucose (Primary) controlled with diet  -     Hemoglobin A1c; Future  -     Comprehensive Metabolic Panel; Future  -     Lipid Panel; Future  -     CBC Auto Differential; Future  -     TSH Rfx On Abnormal To Free T4; Future    2. Mixed hyperlipidemia controlled with diet  -     Comprehensive Metabolic Panel; Future  -     Lipid Panel; Future  -     TSH Rfx On Abnormal To Free T4; Future    3. Vitamin D deficiency  -     Vitamin D,25-Hydroxy    4. Trigeminal neuralgia stable on current prescription medication    5. Mass of left side of neck  -     Ambulatory Referral to ENT (Otolaryngology)    6. Coronary artery calcification-recently saw cardiology and is having a work-up    7. Anxiety stable on current prescription medication          Follow Up  Return in about 2 months (around 7/5/2023) for Recheck.    Carli Parada,   "

## 2023-05-06 LAB — 25(OH)D3 SERPL-MCNC: 19.6 NG/ML (ref 30–100)

## 2023-05-08 ENCOUNTER — TELEPHONE (OUTPATIENT)
Dept: INTERNAL MEDICINE | Facility: CLINIC | Age: 63
End: 2023-05-08
Payer: MEDICAID

## 2023-05-08 NOTE — TELEPHONE ENCOUNTER
----- Message from Carli Parada DO sent at 5/8/2023  9:38 AM EDT -----  Vitamin D is a bit low, please start vitamin D over-the-counter, 2000 IU and take 2 daily  Blood sugar is running a bit high but it appears she is controlling it with what she is eating  Bad cholesterol is running a bit high but it appears she is controlling it with her diet, please ask her to watch her diet more closely.

## 2023-05-16 ENCOUNTER — TELEPHONE (OUTPATIENT)
Dept: INTERNAL MEDICINE | Facility: CLINIC | Age: 63
End: 2023-05-16
Payer: MEDICAID

## 2023-05-16 ENCOUNTER — TELEPHONE (OUTPATIENT)
Dept: INTERNAL MEDICINE | Facility: CLINIC | Age: 63
End: 2023-05-16

## 2023-05-16 NOTE — LETTER
May 16, 2023      Melly Cantor    [address] [facility] [address]        Dear Ms. Cantor    Below are the results from your recent visit:    Lab on 05/05/2023   Component Date Value Ref Range Status   • Hemoglobin A1C 05/05/2023 6.00 (H)  4.80 - 5.60 % Final   • Glucose 05/05/2023 108 (H)  65 - 99 mg/dL Final   • BUN 05/05/2023 13  8 - 23 mg/dL Final   • Creatinine 05/05/2023 0.71  0.57 - 1.00 mg/dL Final   • Sodium 05/05/2023 140  136 - 145 mmol/L Final   • Potassium 05/05/2023 4.1  3.5 - 5.2 mmol/L Final   • Chloride 05/05/2023 104  98 - 107 mmol/L Final   • CO2 05/05/2023 26.4  22.0 - 29.0 mmol/L Final   • Calcium 05/05/2023 9.7  8.6 - 10.5 mg/dL Final   • Total Protein 05/05/2023 7.0  6.0 - 8.5 g/dL Final   • Albumin 05/05/2023 4.5  3.5 - 5.2 g/dL Final   • ALT (SGPT) 05/05/2023 11  1 - 33 U/L Final   • AST (SGOT) 05/05/2023 17  1 - 32 U/L Final   • Alkaline Phosphatase 05/05/2023 77  39 - 117 U/L Final   • Total Bilirubin 05/05/2023 0.4  0.0 - 1.2 mg/dL Final   • Globulin 05/05/2023 2.5  gm/dL Final   • A/G Ratio 05/05/2023 1.8  g/dL Final   • BUN/Creatinine Ratio 05/05/2023 18.3  7.0 - 25.0 Final   • Anion Gap 05/05/2023 9.6  5.0 - 15.0 mmol/L Final   • eGFR 05/05/2023 96.3  >60.0 mL/min/1.73 Final   • Total Cholesterol 05/05/2023 191  0 - 200 mg/dL Final   • Triglycerides 05/05/2023 69  0 - 150 mg/dL Final   • HDL Cholesterol 05/05/2023 51  40 - 60 mg/dL Final   • LDL Cholesterol  05/05/2023 127 (H)  0 - 100 mg/dL Final   • VLDL Cholesterol 05/05/2023 13  5 - 40 mg/dL Final   • LDL/HDL Ratio 05/05/2023 2.47   Final   • WBC 05/05/2023 4.05  3.40 - 10.80 10*3/mm3 Final   • RBC 05/05/2023 4.72  3.77 - 5.28 10*6/mm3 Final   • Hemoglobin 05/05/2023 13.1  12.0 - 15.9 g/dL Final   • Hematocrit 05/05/2023 39.7  34.0 - 46.6 % Final   • MCV 05/05/2023 84.1  79.0 - 97.0 fL Final   • MCH 05/05/2023 27.8  26.6 - 33.0 pg Final   • MCHC 05/05/2023 33.0  31.5 - 35.7 g/dL Final   • RDW 05/05/2023 13.0  12.3 -  15.4 % Final   • RDW-SD 05/05/2023 39.7  37.0 - 54.0 fl Final   • MPV 05/05/2023 10.8  6.0 - 12.0 fL Final   • Platelets 05/05/2023 210  140 - 450 10*3/mm3 Final   • Neutrophil % 05/05/2023 53.9  42.7 - 76.0 % Final   • Lymphocyte % 05/05/2023 34.1  19.6 - 45.3 % Final   • Monocyte % 05/05/2023 8.4  5.0 - 12.0 % Final   • Eosinophil % 05/05/2023 1.7  0.3 - 6.2 % Final   • Basophil % 05/05/2023 1.7 (H)  0.0 - 1.5 % Final   • Immature Grans % 05/05/2023 0.2  0.0 - 0.5 % Final   • Neutrophils, Absolute 05/05/2023 2.18  1.70 - 7.00 10*3/mm3 Final   • Lymphocytes, Absolute 05/05/2023 1.38  0.70 - 3.10 10*3/mm3 Final   • Monocytes, Absolute 05/05/2023 0.34  0.10 - 0.90 10*3/mm3 Final   • Eosinophils, Absolute 05/05/2023 0.07  0.00 - 0.40 10*3/mm3 Final   • Basophils, Absolute 05/05/2023 0.07  0.00 - 0.20 10*3/mm3 Final   • Immature Grans, Absolute 05/05/2023 0.01  0.00 - 0.05 10*3/mm3 Final   • nRBC 05/05/2023 0.0  0.0 - 0.2 /100 WBC Final   • TSH 05/05/2023 1.280  0.270 - 4.200 uIU/mL Final       Vitamin D is a bit low, please start vitamin D over-the-counter, 2000 IU and take 2 daily  Blood sugar is running a bit high but it appears she is controlling it with what she is eating  Bad cholesterol is running a bit high but it appears she is controlling it with her diet, please ask her to watch her diet more closely.          Sincerely,      Carli Parada, DO

## 2023-05-16 NOTE — TELEPHONE ENCOUNTER
Pt had several questions in regards to labs again. We had spoke early last week. But she just wanted to clarify everything. So I went into great detail of the results and pt verbalized a very good understanding and had no further questions    Will mail her results

## 2023-05-16 NOTE — TELEPHONE ENCOUNTER
Caller: Melly Cantor    Relationship: Self    Best call back number:[phone]    What form or medical record are you requesting: COPY OF MRI/CT SCAN WITH CONTRAST    Who is requesting this form or medical record from you: PATIENT    How would you like to receive the form or medical records (pick-up, mail, fax): MAILED    If mail, what is the address:     [address] [place] [id number]    Timeframe paperwork needed: AS SOON AS POSSIBLE

## 2023-05-16 NOTE — TELEPHONE ENCOUNTER
Caller: Melly Cantor    Relationship: Self    Best call back number: [phone]    What is the best time to reach you: ANYTIME    Who are you requesting to speak with (clinical staff, provider,  specific staff member): PCP/MA      What was the call regarding: PATIENT REQUEST CALLBACK WITH QUESTIONS REGARDING BLOOD WORK-ALSO WOULD LIKE A COPY OF THAT MAILED TO     [address] [place] [id number]    Do you require a callback: YES

## 2023-08-15 ENCOUNTER — LAB (OUTPATIENT)
Dept: INTERNAL MEDICINE | Facility: CLINIC | Age: 63
End: 2023-08-15
Payer: MEDICAID

## 2023-08-15 ENCOUNTER — OFFICE VISIT (OUTPATIENT)
Dept: INTERNAL MEDICINE | Facility: CLINIC | Age: 63
End: 2023-08-15
Payer: MEDICAID

## 2023-08-15 VITALS
DIASTOLIC BLOOD PRESSURE: 66 MMHG | HEIGHT: 61 IN | OXYGEN SATURATION: 100 % | TEMPERATURE: 97.8 F | WEIGHT: 110 LBS | RESPIRATION RATE: 16 BRPM | HEART RATE: 70 BPM | BODY MASS INDEX: 20.77 KG/M2 | SYSTOLIC BLOOD PRESSURE: 114 MMHG

## 2023-08-15 DIAGNOSIS — Z00.00 ANNUAL PHYSICAL EXAM: ICD-10-CM

## 2023-08-15 DIAGNOSIS — E55.9 VITAMIN D DEFICIENCY: ICD-10-CM

## 2023-08-15 DIAGNOSIS — M25.50 ARTHRALGIA, UNSPECIFIED JOINT: ICD-10-CM

## 2023-08-15 DIAGNOSIS — R53.83 OTHER FATIGUE: ICD-10-CM

## 2023-08-15 DIAGNOSIS — R73.09 ELEVATED GLUCOSE: ICD-10-CM

## 2023-08-15 DIAGNOSIS — Z00.00 ANNUAL PHYSICAL EXAM: Primary | ICD-10-CM

## 2023-08-15 LAB
BASOPHILS # BLD AUTO: 0.04 10*3/MM3 (ref 0–0.2)
BASOPHILS NFR BLD AUTO: 0.7 % (ref 0–1.5)
BILIRUB BLD-MCNC: NEGATIVE MG/DL
CLARITY, POC: CLEAR
COLOR UR: YELLOW
DEPRECATED RDW RBC AUTO: 40.5 FL (ref 37–54)
EOSINOPHIL # BLD AUTO: 0.49 10*3/MM3 (ref 0–0.4)
EOSINOPHIL NFR BLD AUTO: 8.5 % (ref 0.3–6.2)
ERYTHROCYTE [DISTWIDTH] IN BLOOD BY AUTOMATED COUNT: 12.6 % (ref 12.3–15.4)
ERYTHROCYTE [SEDIMENTATION RATE] IN BLOOD: 7 MM/HR (ref 0–30)
EXPIRATION DATE: NORMAL
GLUCOSE UR STRIP-MCNC: NEGATIVE MG/DL
HCT VFR BLD AUTO: 41 % (ref 34–46.6)
HGB BLD-MCNC: 13.6 G/DL (ref 12–15.9)
IMM GRANULOCYTES # BLD AUTO: 0.01 10*3/MM3 (ref 0–0.05)
IMM GRANULOCYTES NFR BLD AUTO: 0.2 % (ref 0–0.5)
KETONES UR QL: NEGATIVE
LEUKOCYTE EST, POC: NEGATIVE
LYMPHOCYTES # BLD AUTO: 1.62 10*3/MM3 (ref 0.7–3.1)
LYMPHOCYTES NFR BLD AUTO: 28.1 % (ref 19.6–45.3)
Lab: NORMAL
MCH RBC QN AUTO: 29.2 PG (ref 26.6–33)
MCHC RBC AUTO-ENTMCNC: 33.2 G/DL (ref 31.5–35.7)
MCV RBC AUTO: 88.2 FL (ref 79–97)
MONOCYTES # BLD AUTO: 0.39 10*3/MM3 (ref 0.1–0.9)
MONOCYTES NFR BLD AUTO: 6.8 % (ref 5–12)
NEUTROPHILS NFR BLD AUTO: 3.22 10*3/MM3 (ref 1.7–7)
NEUTROPHILS NFR BLD AUTO: 55.7 % (ref 42.7–76)
NITRITE UR-MCNC: NEGATIVE MG/ML
NRBC BLD AUTO-RTO: 0 /100 WBC (ref 0–0.2)
PH UR: 6 [PH] (ref 5–8)
PLATELET # BLD AUTO: 232 10*3/MM3 (ref 140–450)
PMV BLD AUTO: 10.9 FL (ref 6–12)
PROT UR STRIP-MCNC: NEGATIVE MG/DL
RBC # BLD AUTO: 4.65 10*6/MM3 (ref 3.77–5.28)
RBC # UR STRIP: NEGATIVE /UL
SP GR UR: 1.01 (ref 1–1.03)
UROBILINOGEN UR QL: NORMAL
WBC NRBC COR # BLD: 5.77 10*3/MM3 (ref 3.4–10.8)

## 2023-08-15 PROCEDURE — 85652 RBC SED RATE AUTOMATED: CPT | Performed by: FAMILY MEDICINE

## 2023-08-15 PROCEDURE — 83036 HEMOGLOBIN GLYCOSYLATED A1C: CPT | Performed by: FAMILY MEDICINE

## 2023-08-15 PROCEDURE — 84439 ASSAY OF FREE THYROXINE: CPT | Performed by: FAMILY MEDICINE

## 2023-08-15 PROCEDURE — 86431 RHEUMATOID FACTOR QUANT: CPT | Performed by: FAMILY MEDICINE

## 2023-08-15 PROCEDURE — 80050 GENERAL HEALTH PANEL: CPT | Performed by: FAMILY MEDICINE

## 2023-08-15 PROCEDURE — 80061 LIPID PANEL: CPT | Performed by: FAMILY MEDICINE

## 2023-08-15 PROCEDURE — 82306 VITAMIN D 25 HYDROXY: CPT | Performed by: FAMILY MEDICINE

## 2023-08-15 PROCEDURE — 36415 COLL VENOUS BLD VENIPUNCTURE: CPT | Performed by: FAMILY MEDICINE

## 2023-08-15 PROCEDURE — 82550 ASSAY OF CK (CPK): CPT | Performed by: FAMILY MEDICINE

## 2023-08-15 PROCEDURE — 84481 FREE ASSAY (FT-3): CPT | Performed by: FAMILY MEDICINE

## 2023-08-15 PROCEDURE — 86038 ANTINUCLEAR ANTIBODIES: CPT | Performed by: FAMILY MEDICINE

## 2023-08-15 RX ORDER — LANCETS 28 GAUGE
EACH MISCELLANEOUS
Qty: 100 EACH | Refills: 12 | Status: SHIPPED | OUTPATIENT
Start: 2023-08-15

## 2023-08-15 RX ORDER — MELATONIN
1000 DAILY
COMMUNITY

## 2023-08-15 NOTE — PROGRESS NOTES
Female Physical Note      Date: 08/15/2023   Patient Name: Melly Cantor  : 1960   MRN: [id number]     Chief Complaint   Patient presents with    Annual Exam     Last PAP 2023, would like breast exam today     Mass     Still concerned about mass on chest, feels like it has grown        History of Present Illness: Melly Cantor is a 63 y.o. female who is here today for their annual physical exam. States she is doing well and is up to date with most of her vaccinations and preventative medicine exams. She feels she has been doing well otherwise.  The elevated area on her left clavicle is discussed again today and the MRI she had and April of this area is discussed with her again today and she is up-to-date with her screenings or has them scheduled already and she has some arthralgias and would like to be checked for lupus and she would like her T3 and T4 checked because of hair thinning and other symptoms.    Subjective      Review of Systems   Respiratory:  Negative for cough, shortness of breath and wheezing.    Cardiovascular:  Negative for chest pain and palpitations.   Gastrointestinal:  Negative for blood in stool, diarrhea and vomiting.   Genitourinary:  Negative for dysuria, flank pain and genital sores.     Past Medical History, Social History, Family History and Care Team were all reviewed with patient and updated as appropriate.     Allergies   Allergen Reactions    Amoxicillin-Pot Clavulanate Itching, Nausea Only, Other (See Comments), Rash and GI Intolerance    Latex Itching    Compazine [Prochlorperazine] Unknown - High Severity    Oxycodone-Acetaminophen Nausea Only    Penicillins Unknown - High Severity    Phenergan [Promethazine] Unknown - High Severity    Sulfamethoxazole-Trimethoprim Nausea Only    Zithromax [Azithromycin] Unknown - High Severity         Current Outpatient Medications:     Cholecalciferol 25 MCG (1000 UT) tablet, Take 1 tablet by mouth Daily., Disp: , Rfl:      dorzolamide (TRUSOPT) 2 % ophthalmic solution, Administer 1 drop to both eyes 4 (Four) Times a Day., Disp: 10 mL, Rfl: 2    hydrOXYzine (ATARAX) 25 MG tablet, Take 1 tablet by mouth 3 (Three) Times a Day As Needed (Hives)., Disp: 90 tablet, Rfl: 3    Loratadine 10 MG capsule, loratadine 10 mg capsule  Take 1 capsule as needed by oral route., Disp: , Rfl:     travoprost, BAK free, (TRAVATAN) 0.004 % solution ophthalmic solution, Administer 1 drop to both eyes Daily. in affected eye(s), Disp: 2.5 mL, Rfl: 2    ASPIRIN 81 PO, Take 81 mg by mouth Every Other Day. (Patient not taking: Reported on 8/15/2023), Disp: , Rfl:     carBAMazepine XR (TEGretol-XR) 100 MG 12 hr tablet, Take 1 tablet by mouth 2 (Two) Times a Day. (Patient not taking: Reported on 8/15/2023), Disp: 30 tablet, Rfl: 5    glucose blood test strip, Use as instructed-use one daily Dx e11.9, Disp: 100 each, Rfl: 12    Lancets (freestyle) lancets, Use one daily Dx e11.9, Disp: 100 each, Rfl: 12    Health Maintenance Summary            Overdue - COVID-19 Vaccine (1) Overdue - never done      No completion, postpone, or frequency change history exists for this topic.              Overdue - TDAP/TD VACCINES (1 - Tdap) Overdue - never done      No completion, postpone, or frequency change history exists for this topic.              Overdue - ZOSTER VACCINE (1 of 2) Overdue - never done      No completion, postpone, or frequency change history exists for this topic.              Overdue - HEPATITIS C SCREENING (Once) Overdue - never done      No completion, postpone, or frequency change history exists for this topic.              Overdue - ANNUAL PHYSICAL (Yearly) Overdue - never done      No completion, postpone, or frequency change history exists for this topic.              Overdue - PAP SMEAR (Every 3 Years) Overdue - never done      No completion, postpone, or frequency change history exists for this topic.              Postponed - COLORECTAL CANCER  "SCREENING (COLONOSCOPY - Every 10 Years) Postponed until 1/1/2024 02/16/2023  Postponed until 1/1/2024 by Federico Garrido APRN (Patient Refused - Colonoscopy due 2027, declines cologuard)              INFLUENZA VACCINE (Yearly - October to March) Next due on 10/1/2023      No completion, postpone, or frequency change history exists for this topic.              Ordered - LIPID PANEL (Yearly) Ordered on 8/15/2023      05/05/2023  Lipid Panel    02/16/2023  Lipid Panel              MAMMOGRAM (Every 2 Years) Next due on 3/3/2025      03/03/2023  Mammo Screening Digital Tomosynthesis Bilateral With CAD              Pneumococcal Vaccine 0-64 (Series Information) Aged Out      No completion, postpone, or frequency change history exists for this topic.                      There is no immunization history on file for this patient.    Social History     Substance and Sexual Activity   Sexual Activity Defer     No LMP recorded. Patient is postmenopausal.    Objective     Physical Exam:  Vitals:    08/15/23 1017   BP: 114/66   Pulse: 70   Resp: 16   Temp: 97.8 øF (36.6 øC)   TempSrc: Infrared   SpO2: 100%   Weight: 49.9 kg (110 lb)   Height: 153.7 cm (60.51\")     Body mass index is 21.12 kg/mý.     Physical Exam  Vitals and nursing note reviewed.   Constitutional:       General: She is awake.   HENT:      Right Ear: Tympanic membrane and ear canal normal.      Left Ear: Tympanic membrane and ear canal normal.      Mouth/Throat:      Mouth: Mucous membranes are moist.      Pharynx: Oropharynx is clear.   Eyes:      Conjunctiva/sclera: Conjunctivae normal.   Neck:      Thyroid: No thyroid mass.   Cardiovascular:      Rate and Rhythm: Normal rate and regular rhythm.      Heart sounds: Normal heart sounds. No murmur heard.    No friction rub. No gallop.   Pulmonary:      Effort: Pulmonary effort is normal.      Breath sounds: Normal breath sounds. No wheezing, rhonchi or rales.   Abdominal:      Palpations: Abdomen is " soft. There is no hepatomegaly or splenomegaly.      Tenderness: There is no abdominal tenderness. There is no rebound.   Musculoskeletal:      Cervical back: Normal range of motion.      Right lower leg: No edema.      Left lower leg: No edema.   Lymphadenopathy:      Cervical: No cervical adenopathy.   Neurological:      General: No focal deficit present.      Mental Status: She is alert and oriented to person, place, and time.       Procedures    Assessment / Plan      Assessment/Plan:   Diagnoses and all orders for this visit:    1. Annual physical exam (Primary)  -     POC Urinalysis Dipstick, Automated  -     Comprehensive Metabolic Panel; Future  -     Lipid Panel; Future  -     CBC Auto Differential; Future  -     TSH Rfx On Abnormal To Free T4; Future    2. Elevated glucose  -     Lancets (freestyle) lancets; Use one daily  Dx e11.9  Dispense: 100 each; Refill: 12  -     glucose blood test strip; Use as instructed-use one daily  Dx e11.9  Dispense: 100 each; Refill: 12  -     Hemoglobin A1c; Future    3. Other fatigue  -     T3, free; Future  -     T4, free; Future    4. Vitamin D deficiency  -     Vitamin D,25-Hydroxy; Future    5. Arthralgia, unspecified joint  -     NIRMALA Direct Reflex to 11 Biomarker; Future  -     CK; Future  -     Rheumatoid Factor; Future  -     Sedimentation rate; Future         BMI is within normal parameters. No other follow-up for BMI required.  Age-appropriate and preventive counseling is given today    Follow Up:   4 months        Carli Parada DO

## 2023-08-16 ENCOUNTER — TELEPHONE (OUTPATIENT)
Dept: INTERNAL MEDICINE | Facility: CLINIC | Age: 63
End: 2023-08-16
Payer: MEDICAID

## 2023-08-16 LAB
25(OH)D3 SERPL-MCNC: 34.6 NG/ML (ref 30–100)
ALBUMIN SERPL-MCNC: 4.5 G/DL (ref 3.5–5.2)
ALBUMIN/GLOB SERPL: 1.9 G/DL
ALP SERPL-CCNC: 72 U/L (ref 39–117)
ALT SERPL W P-5'-P-CCNC: 11 U/L (ref 1–33)
ANION GAP SERPL CALCULATED.3IONS-SCNC: 9.5 MMOL/L (ref 5–15)
AST SERPL-CCNC: 16 U/L (ref 1–32)
BILIRUB SERPL-MCNC: 0.3 MG/DL (ref 0–1.2)
BUN SERPL-MCNC: 9 MG/DL (ref 8–23)
BUN/CREAT SERPL: 12.5 (ref 7–25)
CALCIUM SPEC-SCNC: 10.1 MG/DL (ref 8.6–10.5)
CHLORIDE SERPL-SCNC: 106 MMOL/L (ref 98–107)
CHOLEST SERPL-MCNC: 201 MG/DL (ref 0–200)
CHROMATIN AB SERPL-ACNC: <10 IU/ML (ref 0–14)
CK SERPL-CCNC: 58 U/L (ref 20–180)
CO2 SERPL-SCNC: 26.5 MMOL/L (ref 22–29)
CREAT SERPL-MCNC: 0.72 MG/DL (ref 0.57–1)
EGFRCR SERPLBLD CKD-EPI 2021: 94.1 ML/MIN/1.73
GLOBULIN UR ELPH-MCNC: 2.4 GM/DL
GLUCOSE SERPL-MCNC: 90 MG/DL (ref 65–99)
HBA1C MFR BLD: 5.8 % (ref 4.8–5.6)
HDLC SERPL-MCNC: 57 MG/DL (ref 40–60)
LDLC SERPL CALC-MCNC: 125 MG/DL (ref 0–100)
LDLC/HDLC SERPL: 2.15 {RATIO}
POTASSIUM SERPL-SCNC: 4.7 MMOL/L (ref 3.5–5.2)
PROT SERPL-MCNC: 6.9 G/DL (ref 6–8.5)
SODIUM SERPL-SCNC: 142 MMOL/L (ref 136–145)
T3FREE SERPL-MCNC: 2.81 PG/ML (ref 2–4.4)
T4 FREE SERPL-MCNC: 0.93 NG/DL (ref 0.93–1.7)
TRIGL SERPL-MCNC: 108 MG/DL (ref 0–150)
TSH SERPL DL<=0.05 MIU/L-ACNC: 1.05 UIU/ML (ref 0.27–4.2)
VLDLC SERPL-MCNC: 19 MG/DL (ref 5–40)

## 2023-08-16 NOTE — LETTER
August 18, 2023      Melly Cantor    [address] [facility] [address]        Dear Ms. Cantor    Below are the results from your recent visit:    Lab on 08/15/2023   Component Date Value Ref Range Status    25 Hydroxy, Vitamin D 08/15/2023 34.6  30.0 - 100.0 ng/ml Final    Hemoglobin A1C 08/15/2023 5.80 (H)  4.80 - 5.60 % Final    Glucose 08/15/2023 90  65 - 99 mg/dL Final    BUN 08/15/2023 9  8 - 23 mg/dL Final    Creatinine 08/15/2023 0.72  0.57 - 1.00 mg/dL Final    Sodium 08/15/2023 142  136 - 145 mmol/L Final    Potassium 08/15/2023 4.7  3.5 - 5.2 mmol/L Final    Chloride 08/15/2023 106  98 - 107 mmol/L Final    CO2 08/15/2023 26.5  22.0 - 29.0 mmol/L Final    Calcium 08/15/2023 10.1  8.6 - 10.5 mg/dL Final    Total Protein 08/15/2023 6.9  6.0 - 8.5 g/dL Final    Albumin 08/15/2023 4.5  3.5 - 5.2 g/dL Final    ALT (SGPT) 08/15/2023 11  1 - 33 U/L Final    AST (SGOT) 08/15/2023 16  1 - 32 U/L Final    Alkaline Phosphatase 08/15/2023 72  39 - 117 U/L Final    Total Bilirubin 08/15/2023 0.3  0.0 - 1.2 mg/dL Final    Globulin 08/15/2023 2.4  gm/dL Final    A/G Ratio 08/15/2023 1.9  g/dL Final    BUN/Creatinine Ratio 08/15/2023 12.5  7.0 - 25.0 Final    Anion Gap 08/15/2023 9.5  5.0 - 15.0 mmol/L Final    eGFR 08/15/2023 94.1  >60.0 mL/min/1.73 Final    Total Cholesterol 08/15/2023 201 (H)  0 - 200 mg/dL Final    Triglycerides 08/15/2023 108  0 - 150 mg/dL Final    HDL Cholesterol 08/15/2023 57  40 - 60 mg/dL Final    LDL Cholesterol  08/15/2023 125 (H)  0 - 100 mg/dL Final    VLDL Cholesterol 08/15/2023 19  5 - 40 mg/dL Final    LDL/HDL Ratio 08/15/2023 2.15   Final    WBC 08/15/2023 5.77  3.40 - 10.80 10*3/mm3 Final    RBC 08/15/2023 4.65  3.77 - 5.28 10*6/mm3 Final    Hemoglobin 08/15/2023 13.6  12.0 - 15.9 g/dL Final    Hematocrit 08/15/2023 41.0  34.0 - 46.6 % Final    MCV 08/15/2023 88.2  79.0 - 97.0 fL Final    MCH 08/15/2023 29.2  26.6 - 33.0 pg Final    MCHC  08/15/2023 33.2  31.5 - 35.7 g/dL Final    RDW 08/15/2023 12.6  12.3 - 15.4 % Final    RDW-SD 08/15/2023 40.5  37.0 - 54.0 fl Final    MPV 08/15/2023 10.9  6.0 - 12.0 fL Final    Platelets 08/15/2023 232  140 - 450 10*3/mm3 Final    Neutrophil % 08/15/2023 55.7  42.7 - 76.0 % Final    Lymphocyte % 08/15/2023 28.1  19.6 - 45.3 % Final    Monocyte % 08/15/2023 6.8  5.0 - 12.0 % Final    Eosinophil % 08/15/2023 8.5 (H)  0.3 - 6.2 % Final    Basophil % 08/15/2023 0.7  0.0 - 1.5 % Final    Immature Grans % 08/15/2023 0.2  0.0 - 0.5 % Final    Neutrophils, Absolute 08/15/2023 3.22  1.70 - 7.00 10*3/mm3 Final    Lymphocytes, Absolute 08/15/2023 1.62  0.70 - 3.10 10*3/mm3 Final    Monocytes, Absolute 08/15/2023 0.39  0.10 - 0.90 10*3/mm3 Final    Eosinophils, Absolute 08/15/2023 0.49 (H)  0.00 - 0.40 10*3/mm3 Final    Basophils, Absolute 08/15/2023 0.04  0.00 - 0.20 10*3/mm3 Final    Immature Grans, Absolute 08/15/2023 0.01  0.00 - 0.05 10*3/mm3 Final    nRBC 08/15/2023 0.0  0.0 - 0.2 /100 WBC Final    TSH 08/15/2023 1.050  0.270 - 4.200 uIU/mL Final    T3, Free 08/15/2023 2.81  2.00 - 4.40 pg/mL Final    Free T4 08/15/2023 0.93  0.93 - 1.70 ng/dL Final    NIRMALA Direct 08/15/2023 Negative  Negative Final    Creatine Kinase 08/15/2023 58  20 - 180 U/L Final    Rheumatoid Factor Quantitative 08/15/2023 <10.0  0.0 - 14.0 IU/mL Final    Sed Rate 08/15/2023 7  0 - 30 mm/hr Final           Labs are stable and all autoimmune/arthritis labs are negative.      Sincerely,      Carli Parada DO

## 2023-08-16 NOTE — TELEPHONE ENCOUNTER
Caller: Melly Cantor    Relationship: Self    Best call back number: [phone]     What is the best time to reach you: ANYTIME    Who are you requesting to speak with (clinical staff, provider,  specific staff member): PCP/MA        What was the call regarding: CALLBACK REGARDING TEST RESULTS FROM YESTERDAY    Is it okay if the provider responds through MyChart: CALLBACK

## 2023-08-17 LAB — ANA SER QL: NEGATIVE

## 2023-08-18 NOTE — TELEPHONE ENCOUNTER
Called and lvm for patient to return call, office number given.     HUB: if patient returns call please let her know labs have been printed and are ready for  here in the office.

## 2023-08-18 NOTE — TELEPHONE ENCOUNTER
HUB READ MESSAGE TO PATIENT AND SHE WOULD LIKE A COPY OF THE RESULTS SO THAT SHE CAN COME PICK THEM UP AT THE OFFICE.

## 2023-08-18 NOTE — TELEPHONE ENCOUNTER
Called and lvm for patient to return call, office number given.     HUB: if patient returns call please relay providers message:     Labs are stable and all autoimmune/arthritis labs are negative

## 2023-10-19 ENCOUNTER — OFFICE VISIT (OUTPATIENT)
Dept: INTERNAL MEDICINE | Facility: CLINIC | Age: 63
End: 2023-10-19
Payer: MEDICAID

## 2023-10-19 VITALS
RESPIRATION RATE: 16 BRPM | SYSTOLIC BLOOD PRESSURE: 110 MMHG | OXYGEN SATURATION: 98 % | TEMPERATURE: 98.2 F | DIASTOLIC BLOOD PRESSURE: 66 MMHG | HEART RATE: 101 BPM

## 2023-10-19 DIAGNOSIS — R50.9 FEVER, UNSPECIFIED FEVER CAUSE: ICD-10-CM

## 2023-10-19 DIAGNOSIS — U07.1 COVID-19: Primary | ICD-10-CM

## 2023-10-19 DIAGNOSIS — R05.1 ACUTE COUGH: ICD-10-CM

## 2023-10-19 LAB
EXPIRATION DATE: ABNORMAL
EXPIRATION DATE: NORMAL
EXPIRATION DATE: NORMAL
FLUAV AG NPH QL: NEGATIVE
FLUBV AG NPH QL: NEGATIVE
INTERNAL CONTROL: ABNORMAL
INTERNAL CONTROL: NORMAL
INTERNAL CONTROL: NORMAL
Lab: ABNORMAL
Lab: NORMAL
Lab: NORMAL
S PYO AG THROAT QL: NEGATIVE
SARS-COV-2 AG UPPER RESP QL IA.RAPID: DETECTED

## 2023-10-19 RX ORDER — BENZONATATE 100 MG/1
100 CAPSULE ORAL 3 TIMES DAILY PRN
Qty: 30 CAPSULE | Refills: 0 | Status: SHIPPED | OUTPATIENT
Start: 2023-10-19

## 2023-10-19 NOTE — PROGRESS NOTES
Office Note     Name: Melly Cantor    : 1960     MRN: [id number]     Chief Complaint  Fever (Fever, chills, started a few days ago, sore throat )    Subjective     History of Present Illness:  Melly Cantor is a 63 y.o. female who presents today for fever, chills and sore throat x 2 days. States she is living with her daughter and grandchild who are also having similar symptoms. Has minor cough but denies SOB or chest pain. States her son works at [employer] as a nurse and is concerned he brought covid home with him. She states she is very careful when she does go out of the house which is not often.     Review of Systems   Constitutional:  Positive for chills, fatigue and fever.   HENT:  Positive for postnasal drip and sore throat.    Eyes: Negative.    Respiratory:  Positive for cough. Negative for shortness of breath and wheezing.    Cardiovascular: Negative.  Negative for chest pain and palpitations.   Gastrointestinal: Negative.        Past Medical History:   Diagnosis Date    Cataract     Hyperlipidemia     Prediabetes     Trigeminal neuralgia      Past Surgical History:   Procedure Laterality Date     SECTION      KNEE ARTHROSCOPY      TONSILLECTOMY      TUBAL ABDOMINAL LIGATION         Current Outpatient Medications:     ASPIRIN 81 PO, Take 81 mg by mouth Every Other Day., Disp: , Rfl:     Cholecalciferol 25 MCG (1000 UT) tablet, Take 1 tablet by mouth Daily., Disp: , Rfl:     dorzolamide (TRUSOPT) 2 % ophthalmic solution, Administer 1 drop to both eyes 4 (Four) Times a Day., Disp: 10 mL, Rfl: 2    glucose blood test strip, Use as instructed-use one daily Dx e11.9, Disp: 100 each, Rfl: 12    hydrOXYzine (ATARAX) 25 MG tablet, Take 1 tablet by mouth 3 (Three) Times a Day As Needed (Hives)., Disp: 90 tablet, Rfl: 3    Lancets (freestyle) lancets, Use one daily Dx e11.9, Disp: 100 each, Rfl: 12    Loratadine 10 MG capsule, loratadine 10 mg capsule  Take 1 capsule as needed by oral  "route., Disp: , Rfl:     travoprost, BAK free, (TRAVATAN) 0.004 % solution ophthalmic solution, Administer 1 drop to both eyes Daily. in affected eye(s), Disp: 2.5 mL, Rfl: 2    benzonatate (Tessalon Perles) 100 MG capsule, Take 1 capsule by mouth 3 (Three) Times a Day As Needed for Cough., Disp: 30 capsule, Rfl: 0    Nirmatrelvir&Ritonavir 300/100 (PAXLOVID) 20 x 150 MG & 10 x 100MG tablet therapy pack tablet, Take 3 tablets by mouth 2 (Two) Times a Day for 5 days., Disp: 30 tablet, Rfl: 0  Allergies   Allergen Reactions    Amoxicillin-Pot Clavulanate Itching, Nausea Only, Other (See Comments), Rash and GI Intolerance    Latex Itching    Compazine [Prochlorperazine] Unknown - High Severity    Oxycodone-Acetaminophen Nausea Only    Penicillins Unknown - High Severity    Phenergan [Promethazine] Unknown - High Severity    Sulfamethoxazole-Trimethoprim Nausea Only    Zithromax [Azithromycin] Unknown - High Severity       Objective     Vital Signs  /66   Pulse 101   Temp 98.2 °F (36.8 °C) (Infrared)   Resp 16   SpO2 98%   Estimated body mass index is 21.12 kg/m² as calculated from the following:    Height as of 8/15/23: 153.7 cm (60.51\").    Weight as of 8/15/23: 49.9 kg (110 lb).    BMI is within normal parameters. No other follow-up for BMI required.      Physical Exam  Constitutional:       Appearance: Normal appearance.   HENT:      Right Ear: Tympanic membrane normal.      Left Ear: Tympanic membrane normal.      Nose: Rhinorrhea present.      Mouth/Throat:      Mouth: Mucous membranes are moist.      Pharynx: Oropharynx is clear. No oropharyngeal exudate or posterior oropharyngeal erythema.      Tonsils: No tonsillar exudate or tonsillar abscesses. 0 on the right. 0 on the left.   Eyes:      Pupils: Pupils are equal, round, and reactive to light.   Cardiovascular:      Rate and Rhythm: Regular rhythm. Tachycardia present.      Pulses: Normal pulses.           Radial pulses are 2+ on the right side and " 2+ on the left side.      Heart sounds: Normal heart sounds, S1 normal and S2 normal. No murmur heard.  Pulmonary:      Effort: Pulmonary effort is normal. No tachypnea or respiratory distress.      Breath sounds: Normal breath sounds. No decreased breath sounds, wheezing or rhonchi.   Musculoskeletal:      Right lower leg: No edema.      Left lower leg: No edema.   Lymphadenopathy:      Cervical: No cervical adenopathy.   Skin:     General: Skin is warm and dry.      Capillary Refill: Capillary refill takes less than 2 seconds.   Neurological:      General: No focal deficit present.      Mental Status: She is alert and oriented to person, place, and time. Mental status is at baseline.   Psychiatric:         Mood and Affect: Mood normal.         Behavior: Behavior normal.         Thought Content: Thought content normal.         Judgment: Judgment normal.          Results for orders placed or performed in visit on 10/19/23   POCT rapid strep A    Specimen: Swab   Result Value Ref Range    Rapid Strep A Screen Negative Negative, VALID, INVALID, Not Performed    Internal Control Passed Passed    Lot Number [id number]     Expiration Date 11/21/24    POCT VERITOR SARS-CoV-2 Antigen    Specimen: Nasopharynx; Swab   Result Value Ref Range    SARS Antigen Detected (A) Not Detected, Presumptive Negative    Internal Control Passed Passed    Lot Number [id number]     Expiration Date 4/2/2024    POCT Influenza A/B    Specimen: Swab   Result Value Ref Range    Rapid Influenza A Ag Negative Negative    Rapid Influenza B Ag Negative Negative    Internal Control Passed Passed    Lot Number [id number]     Expiration Date 10/23/23               Assessment and Plan     Diagnoses and all orders for this visit:    1. COVID-19 (Primary)  -     Nirmatrelvir&Ritonavir 300/100 (PAXLOVID) 20 x 150 MG & 10 x 100MG tablet therapy pack tablet; Take 3 tablets by mouth 2 (Two) Times a Day for 5 days.  Dispense: 30 tablet; Refill: 0    2. Fever,  unspecified fever cause  -     POCT rapid strep A  -     POCT VERITOR SARS-CoV-2 Antigen  -     POCT Influenza A/B    3. Acute cough  -     benzonatate (Tessalon Perles) 100 MG capsule; Take 1 capsule by mouth 3 (Three) Times a Day As Needed for Cough.  Dispense: 30 capsule; Refill: 0      -COVID test positive.  Patient instructed to take entire course of Paxlovid to help relieve symptoms  -Patient provided with CDC recommendations on isolation.  -Can take Tessalon Perles for cough, Tylenol/ibuprofen for fevers/body aches/headaches.  Encouraged to hydrate and rest.    If labs or images are ordered we will contact you with the results within the next week.  If you have not heard from us after a week please call our office to inquire about the results.    Follow Up  Return if symptoms worsen or fail to improve.    Jacquelyn Gamino, APRN

## 2023-10-31 ENCOUNTER — OFFICE VISIT (OUTPATIENT)
Dept: INTERNAL MEDICINE | Facility: CLINIC | Age: 63
End: 2023-10-31
Payer: MEDICAID

## 2023-10-31 VITALS
RESPIRATION RATE: 14 BRPM | TEMPERATURE: 98.2 F | WEIGHT: 111.2 LBS | SYSTOLIC BLOOD PRESSURE: 112 MMHG | DIASTOLIC BLOOD PRESSURE: 66 MMHG | BODY MASS INDEX: 21.35 KG/M2 | HEART RATE: 90 BPM | OXYGEN SATURATION: 97 %

## 2023-10-31 DIAGNOSIS — U09.9 POST COVID-19 CONDITION, UNSPECIFIED: ICD-10-CM

## 2023-10-31 DIAGNOSIS — R59.1 LYMPHADENOPATHY: ICD-10-CM

## 2023-10-31 DIAGNOSIS — R05.1 ACUTE COUGH: ICD-10-CM

## 2023-10-31 DIAGNOSIS — M54.2 NECK PAIN: Primary | ICD-10-CM

## 2023-10-31 LAB
EXPIRATION DATE: NORMAL
EXPIRATION DATE: NORMAL
FLUAV AG UPPER RESP QL IA.RAPID: NOT DETECTED
FLUBV AG UPPER RESP QL IA.RAPID: NOT DETECTED
INTERNAL CONTROL: NORMAL
INTERNAL CONTROL: NORMAL
Lab: NORMAL
Lab: NORMAL
S PYO AG THROAT QL: NEGATIVE
SARS-COV-2 AG UPPER RESP QL IA.RAPID: NOT DETECTED

## 2023-10-31 PROCEDURE — 87880 STREP A ASSAY W/OPTIC: CPT | Performed by: FAMILY MEDICINE

## 2023-10-31 PROCEDURE — 99214 OFFICE O/P EST MOD 30 MIN: CPT | Performed by: FAMILY MEDICINE

## 2023-10-31 PROCEDURE — 1159F MED LIST DOCD IN RCRD: CPT | Performed by: FAMILY MEDICINE

## 2023-10-31 PROCEDURE — 87428 SARSCOV & INF VIR A&B AG IA: CPT | Performed by: FAMILY MEDICINE

## 2023-10-31 PROCEDURE — 1160F RVW MEDS BY RX/DR IN RCRD: CPT | Performed by: FAMILY MEDICINE

## 2023-10-31 NOTE — PROGRESS NOTES
"    Office Note     Name: Melly Cantor    : 1960     MRN: [id number]     Chief Complaint  Nasal Congestion (Experiencing a little drainage, drainage in ears) and Neck Pain (Lymph nodes on the left side have been causing some pain.)    Subjective     History of Present Illness:  Melly Cantor is a 63 y.o. female who presents today for several concerns.  She states she tested positive for COVID on  in this office and several of her family members also had it and she had an uneventful course from the illness and she still has a lingering bit of cough and fatigue and she has some lymphadenopathy in her neck and the lymph nodes are sore and aggravate her at times but otherwise she is feeling much better and she does have a bit of a cough and congestion leftover but she feels she is much improved.  She does have some questions about any symptoms that may linger.  She wants to be tested again today.    Review of Systems   Respiratory:  Positive for cough. Negative for shortness of breath and wheezing.    Cardiovascular:  Negative for chest pain and palpitations.   Gastrointestinal:  Negative for blood in stool, diarrhea and vomiting.   Genitourinary:  Negative for dysuria, flank pain and genital sores.       Objective     Vital Signs  /66   Pulse 90   Temp 98.2 °F (36.8 °C) (Infrared)   Resp 14   Wt 50.4 kg (111 lb 3.2 oz)   SpO2 97%   BMI 21.35 kg/m²   Estimated body mass index is 21.35 kg/m² as calculated from the following:    Height as of 8/15/23: 153.7 cm (60.51\").    Weight as of this encounter: 50.4 kg (111 lb 3.2 oz).    BMI is within normal parameters. No other follow-up for BMI required.      Physical Exam  Vitals and nursing note reviewed.   HENT:      Head: Normocephalic and atraumatic.      Jaw: There is normal jaw occlusion.      Salivary Glands: Right salivary gland is not diffusely enlarged or tender. Left salivary gland is not diffusely enlarged or tender.      Right " Ear: Hearing, tympanic membrane, ear canal and external ear normal.      Left Ear: Hearing, tympanic membrane, ear canal and external ear normal.      Nose: Nose normal.      Mouth/Throat:      Lips: Pink.      Mouth: Mucous membranes are moist.      Tongue: No lesions.      Palate: No lesions.      Pharynx: Oropharynx is clear.   Neck:      Vascular: No carotid bruit.   Cardiovascular:      Rate and Rhythm: Normal rate and regular rhythm.      Heart sounds: Normal heart sounds. No murmur heard.     No gallop.   Pulmonary:      Effort: Pulmonary effort is normal. No respiratory distress.      Breath sounds: No stridor. No wheezing, rhonchi or rales.   Musculoskeletal:      Cervical back: Normal range of motion and neck supple.      Right lower leg: No edema.      Left lower leg: No edema.   Lymphadenopathy:      Cervical: No cervical adenopathy.   Neurological:      Mental Status: She is alert.                 Assessment and Plan     Diagnoses and all orders for this visit:    1. Neck pain (Primary)    2. Acute cough  -     POCT SARS-CoV-2 Antigen JOSE M + Flu  -     POCT rapid strep A    3. Lymphadenopathy    4. Post covid-19 condition, unspecified  -     POCT SARS-CoV-2 Antigen JOSE M + Flu    Discussed post COVID symptoms at length and recommend hydration and rest and Advil or Tylenol for any neck pain or lymphadenopathy pain and she will return in 3 to 4 weeks with no improvement.      Follow Up  As needed    Carli Parada DO

## 2024-01-03 DIAGNOSIS — L50.9 HIVES: ICD-10-CM

## 2024-01-03 RX ORDER — HYDROXYZINE HYDROCHLORIDE 25 MG/1
25 TABLET, FILM COATED ORAL EVERY 8 HOURS PRN
Qty: 90 TABLET | Refills: 1 | Status: SHIPPED | OUTPATIENT
Start: 2024-01-03

## 2024-02-13 ENCOUNTER — LAB (OUTPATIENT)
Dept: INTERNAL MEDICINE | Facility: CLINIC | Age: 64
End: 2024-02-13
Payer: MEDICAID

## 2024-02-13 ENCOUNTER — OFFICE VISIT (OUTPATIENT)
Dept: INTERNAL MEDICINE | Facility: CLINIC | Age: 64
End: 2024-02-13
Payer: MEDICAID

## 2024-02-13 VITALS
DIASTOLIC BLOOD PRESSURE: 78 MMHG | WEIGHT: 115.8 LBS | HEART RATE: 77 BPM | SYSTOLIC BLOOD PRESSURE: 116 MMHG | BODY MASS INDEX: 21.86 KG/M2 | TEMPERATURE: 98.2 F | HEIGHT: 61 IN | RESPIRATION RATE: 16 BRPM | OXYGEN SATURATION: 98 %

## 2024-02-13 DIAGNOSIS — R73.09 ELEVATED GLUCOSE: ICD-10-CM

## 2024-02-13 DIAGNOSIS — Z12.11 SCREEN FOR COLON CANCER: ICD-10-CM

## 2024-02-13 DIAGNOSIS — M54.2 NECK PAIN: ICD-10-CM

## 2024-02-13 DIAGNOSIS — H02.843 SWELLING OF RIGHT EYELID: ICD-10-CM

## 2024-02-13 DIAGNOSIS — R59.0 CERVICAL LYMPHADENOPATHY: ICD-10-CM

## 2024-02-13 DIAGNOSIS — E78.2 MIXED HYPERLIPIDEMIA: ICD-10-CM

## 2024-02-13 DIAGNOSIS — Z11.59 ENCOUNTER FOR HEPATITIS C SCREENING TEST FOR LOW RISK PATIENT: ICD-10-CM

## 2024-02-13 DIAGNOSIS — F41.9 ANXIETY: ICD-10-CM

## 2024-02-13 DIAGNOSIS — R59.0 CERVICAL LYMPHADENOPATHY: Primary | ICD-10-CM

## 2024-02-13 DIAGNOSIS — E55.9 VITAMIN D DEFICIENCY: ICD-10-CM

## 2024-02-13 LAB
25(OH)D3 SERPL-MCNC: 38.7 NG/ML (ref 30–100)
ALBUMIN SERPL-MCNC: 4.6 G/DL (ref 3.5–5.2)
ALBUMIN/GLOB SERPL: 1.8 G/DL
ALP SERPL-CCNC: 68 U/L (ref 39–117)
ALT SERPL W P-5'-P-CCNC: 14 U/L (ref 1–33)
ANION GAP SERPL CALCULATED.3IONS-SCNC: 11.6 MMOL/L (ref 5–15)
AST SERPL-CCNC: 16 U/L (ref 1–32)
BASOPHILS # BLD AUTO: 0.07 10*3/MM3 (ref 0–0.2)
BASOPHILS NFR BLD AUTO: 1.2 % (ref 0–1.5)
BILIRUB SERPL-MCNC: 0.6 MG/DL (ref 0–1.2)
BUN SERPL-MCNC: 17 MG/DL (ref 8–23)
BUN/CREAT SERPL: 22.1 (ref 7–25)
CALCIUM SPEC-SCNC: 9.5 MG/DL (ref 8.6–10.5)
CHLORIDE SERPL-SCNC: 106 MMOL/L (ref 98–107)
CHOLEST SERPL-MCNC: 224 MG/DL (ref 0–200)
CO2 SERPL-SCNC: 25.4 MMOL/L (ref 22–29)
CREAT SERPL-MCNC: 0.77 MG/DL (ref 0.57–1)
DEPRECATED RDW RBC AUTO: 38.4 FL (ref 37–54)
EGFRCR SERPLBLD CKD-EPI 2021: 86.8 ML/MIN/1.73
EOSINOPHIL # BLD AUTO: 0.37 10*3/MM3 (ref 0–0.4)
EOSINOPHIL NFR BLD AUTO: 6.4 % (ref 0.3–6.2)
ERYTHROCYTE [DISTWIDTH] IN BLOOD BY AUTOMATED COUNT: 12.7 % (ref 12.3–15.4)
GLOBULIN UR ELPH-MCNC: 2.6 GM/DL
GLUCOSE SERPL-MCNC: 102 MG/DL (ref 65–99)
HBA1C MFR BLD: 6.1 % (ref 4.8–5.6)
HCT VFR BLD AUTO: 42.2 % (ref 34–46.6)
HCV AB SER DONR QL: NORMAL
HDLC SERPL-MCNC: 59 MG/DL (ref 40–60)
HGB BLD-MCNC: 13.4 G/DL (ref 12–15.9)
IMM GRANULOCYTES # BLD AUTO: 0.01 10*3/MM3 (ref 0–0.05)
IMM GRANULOCYTES NFR BLD AUTO: 0.2 % (ref 0–0.5)
LDLC SERPL CALC-MCNC: 148 MG/DL (ref 0–100)
LDLC/HDLC SERPL: 2.47 {RATIO}
LYMPHOCYTES # BLD AUTO: 2.09 10*3/MM3 (ref 0.7–3.1)
LYMPHOCYTES NFR BLD AUTO: 36.3 % (ref 19.6–45.3)
MCH RBC QN AUTO: 27.1 PG (ref 26.6–33)
MCHC RBC AUTO-ENTMCNC: 31.8 G/DL (ref 31.5–35.7)
MCV RBC AUTO: 85.3 FL (ref 79–97)
MONOCYTES # BLD AUTO: 0.37 10*3/MM3 (ref 0.1–0.9)
MONOCYTES NFR BLD AUTO: 6.4 % (ref 5–12)
NEUTROPHILS NFR BLD AUTO: 2.85 10*3/MM3 (ref 1.7–7)
NEUTROPHILS NFR BLD AUTO: 49.5 % (ref 42.7–76)
NRBC BLD AUTO-RTO: 0.2 /100 WBC (ref 0–0.2)
PLATELET # BLD AUTO: 226 10*3/MM3 (ref 140–450)
PMV BLD AUTO: 10.6 FL (ref 6–12)
POTASSIUM SERPL-SCNC: 4.2 MMOL/L (ref 3.5–5.2)
PROT SERPL-MCNC: 7.2 G/DL (ref 6–8.5)
RBC # BLD AUTO: 4.95 10*6/MM3 (ref 3.77–5.28)
SODIUM SERPL-SCNC: 143 MMOL/L (ref 136–145)
TRIGL SERPL-MCNC: 97 MG/DL (ref 0–150)
TSH SERPL DL<=0.05 MIU/L-ACNC: 1.55 UIU/ML (ref 0.27–4.2)
VLDLC SERPL-MCNC: 17 MG/DL (ref 5–40)
WBC NRBC COR # BLD AUTO: 5.76 10*3/MM3 (ref 3.4–10.8)

## 2024-02-13 PROCEDURE — 1160F RVW MEDS BY RX/DR IN RCRD: CPT | Performed by: FAMILY MEDICINE

## 2024-02-13 PROCEDURE — G2211 COMPLEX E/M VISIT ADD ON: HCPCS | Performed by: FAMILY MEDICINE

## 2024-02-13 PROCEDURE — 80050 GENERAL HEALTH PANEL: CPT | Performed by: FAMILY MEDICINE

## 2024-02-13 PROCEDURE — 99214 OFFICE O/P EST MOD 30 MIN: CPT | Performed by: FAMILY MEDICINE

## 2024-02-13 PROCEDURE — 80061 LIPID PANEL: CPT | Performed by: FAMILY MEDICINE

## 2024-02-13 PROCEDURE — 1159F MED LIST DOCD IN RCRD: CPT | Performed by: FAMILY MEDICINE

## 2024-02-13 PROCEDURE — 86803 HEPATITIS C AB TEST: CPT | Performed by: FAMILY MEDICINE

## 2024-02-13 PROCEDURE — 82306 VITAMIN D 25 HYDROXY: CPT | Performed by: FAMILY MEDICINE

## 2024-02-13 PROCEDURE — 36415 COLL VENOUS BLD VENIPUNCTURE: CPT | Performed by: FAMILY MEDICINE

## 2024-02-13 PROCEDURE — 83036 HEMOGLOBIN GLYCOSYLATED A1C: CPT | Performed by: FAMILY MEDICINE

## 2024-02-13 RX ORDER — MULTIPLE VITAMINS W/ MINERALS TAB 9MG-400MCG
1 TAB ORAL DAILY
COMMUNITY

## 2024-02-13 RX ORDER — LATANOPROST OPHTHALMIC SOLUTION 0.005% 50 UG/ML
SOLUTION/ DROPS TOPICAL
COMMUNITY
Start: 2024-01-23

## 2024-02-13 RX ORDER — DORZOLAMIDE HYDROCHLORIDE AND TIMOLOL MALEATE 20; 5 MG/ML; MG/ML
SOLUTION/ DROPS OPHTHALMIC
COMMUNITY
Start: 2024-01-22

## 2024-02-13 RX ORDER — VIT C/B6/B5/MAGNESIUM/HERB 173 50-5-6-5MG
CAPSULE ORAL
COMMUNITY

## 2024-02-19 ENCOUNTER — TELEPHONE (OUTPATIENT)
Dept: INTERNAL MEDICINE | Facility: CLINIC | Age: 64
End: 2024-02-19
Payer: MEDICAID

## 2024-02-19 NOTE — TELEPHONE ENCOUNTER
----- Message from Carli Parada DO sent at 2/19/2024 11:16 AM EST -----  Bs has gone up---needs to be more careful with her diet or she will need to start medicine for diabetes soon otherwise.  Recheck these labs in 3 months.  Also her cholesterol panel has gone up as well.  She should strongly consider a low-dose medicine for her cholesterol to decrease risk of heart attack and stroke.

## 2024-02-20 DIAGNOSIS — R73.09 ELEVATED GLUCOSE: ICD-10-CM

## 2024-02-20 RX ORDER — LANCETS 33 GAUGE
EACH MISCELLANEOUS
Qty: 100 EACH | Refills: 0 | Status: SHIPPED | OUTPATIENT
Start: 2024-02-20

## 2024-03-06 ENCOUNTER — TRANSCRIBE ORDERS (OUTPATIENT)
Dept: ADMINISTRATIVE | Facility: HOSPITAL | Age: 64
End: 2024-03-06
Payer: MEDICAID

## 2024-03-06 DIAGNOSIS — Z12.31 ENCOUNTER FOR SCREENING MAMMOGRAM FOR MALIGNANT NEOPLASM OF BREAST: Primary | ICD-10-CM

## 2024-04-08 ENCOUNTER — TELEPHONE (OUTPATIENT)
Dept: INTERNAL MEDICINE | Facility: CLINIC | Age: 64
End: 2024-04-08
Payer: MEDICAID

## 2024-04-08 NOTE — TELEPHONE ENCOUNTER
Left message on voicemail for the patient letting her know that the medication was d/c on 10/13/23 by a RENETTA Hernandez as therapy completed.  Jacquelyn she has an upcoming appointment with you on 4/16/24.  Do you want to prescribe a few until her appointment?  It is not active in her medication list.

## 2024-04-08 NOTE — TELEPHONE ENCOUNTER
Caller: Devaughn Melly Ann     Relationship: Self     Best call back number: [phone]     Requested Prescriptions:   CARBAMAZEINE 100 MG   TAKE ONE IN MORNING AND ONE AT NIGHT    Pharmacy where request should be sent:  [id number] - [place], [place] - [address] AT [facility] [address] - [phone] [facility] [phone] FX      Last office visit with prescribing clinician: 2/13/2024   Last telemedicine visit with prescribing clinician: Visit date not found   Next office visit with prescribing clinician: Visit date not found     Additional details provided by patient: THE PATIENT IS OUT OF THIS MEDICATION. SHE SAID THAT THE LAST PERSON TO PRESCRIBE THIS WAS DR. MEJIA. IF THERE ARE ANY ISSUES FILLING THIS PLEASE LET HER KNOW. THE PATIENT HAS AN ESTABLISH NEW PROVIDER VISIT 04.16.24 WITH [facility]    Does the patient have less than a 3 day supply:  [x] Yes  [] No    Would you like a call back once the refill request has been completed: [] Yes [x] No    If the office needs to give you a call back, can they leave a voicemail: [] Yes [x] No    Cici Mathis Rep   04/08/24 13:56 EDT

## 2024-04-09 DIAGNOSIS — G50.0 TRIGEMINAL NEURALGIA: Primary | ICD-10-CM

## 2024-04-09 RX ORDER — CARBAMAZEPINE 100 MG/1
100 TABLET, EXTENDED RELEASE ORAL 2 TIMES DAILY
Qty: 14 TABLET | Refills: 0 | Status: SHIPPED | OUTPATIENT
Start: 2024-04-09

## 2024-04-16 ENCOUNTER — OFFICE VISIT (OUTPATIENT)
Dept: INTERNAL MEDICINE | Facility: CLINIC | Age: 64
End: 2024-04-16
Payer: MEDICAID

## 2024-04-16 VITALS
SYSTOLIC BLOOD PRESSURE: 114 MMHG | TEMPERATURE: 97.3 F | HEIGHT: 61 IN | HEART RATE: 66 BPM | OXYGEN SATURATION: 98 % | BODY MASS INDEX: 22.28 KG/M2 | WEIGHT: 118 LBS | DIASTOLIC BLOOD PRESSURE: 78 MMHG

## 2024-04-16 DIAGNOSIS — Z12.11 ENCOUNTER FOR SCREENING COLONOSCOPY: ICD-10-CM

## 2024-04-16 DIAGNOSIS — G50.0 TRIGEMINAL NEURALGIA: Primary | ICD-10-CM

## 2024-04-16 PROCEDURE — 1160F RVW MEDS BY RX/DR IN RCRD: CPT

## 2024-04-16 PROCEDURE — 99214 OFFICE O/P EST MOD 30 MIN: CPT

## 2024-04-16 PROCEDURE — 1159F MED LIST DOCD IN RCRD: CPT

## 2024-04-16 RX ORDER — CARBAMAZEPINE 100 MG/1
100 TABLET, EXTENDED RELEASE ORAL 2 TIMES DAILY
Qty: 60 TABLET | Refills: 3 | Status: SHIPPED | OUTPATIENT
Start: 2024-04-16

## 2024-04-16 NOTE — PROGRESS NOTES
Office Note     Name: Melly Cantor    : 1960     MRN: [id number]     Chief Complaint  Establish Care (Offboarding from Dr. Parada) and Neuralgia    Subjective     History of Present Illness:  Melly Cantor is a 63 y.o. female who presents today to establish care. Off boarding Dr. Parada    Trigeminal neuralgia- diagnosed about 1 year ago. Was prescribed tegretol and states her symptoms improved so she stopped taking it Recently she had dental work done and states since then she has had worsening short but frequent sharp pains to the left side of her face. Denies any other associated symptoms. She is requesting refills on the Tegretol as well as a referral to neurology.     She is also requesting a referral to GI because she was is scheduled for colonoscopy with outside facility but would like to keep healthcare within the [facility] system if possible.     Also reviewed recent lab work with patient from 2/15. States she has been working on diet and exercise and weight loss to help with her A1c and cholesterol.     Review of Systems    Past Medical History:   Diagnosis Date    Cataract     Hyperlipidemia     Prediabetes     Trigeminal neuralgia      Past Surgical History:   Procedure Laterality Date     SECTION      KNEE ARTHROSCOPY      TONSILLECTOMY      TUBAL ABDOMINAL LIGATION         Current Outpatient Medications:     carBAMazepine XR (TEGretol-XR) 100 MG 12 hr tablet, Take 1 tablet by mouth 2 (Two) Times a Day., Disp: 60 tablet, Rfl: 3    Cosopt PF 2-0.5 % solution, USE 1 DROP INTO EACH EYE EVERY MORNING AND EVERY NIGHT AT BEDTIME, Disp: , Rfl:     glucose blood test strip, Use as instructed-use one daily Dx e11.9, Disp: 100 each, Rfl: 12    hydrOXYzine (ATARAX) 25 MG tablet, Take 1 tablet by mouth Every 8 (Eight) Hours As Needed for Itching. Appointment needed for further refills., Disp: 90 tablet, Rfl: 1    Iyuzeh 0.005 % solution, INSTILL 1 DROP INTO BOTH EYES AT NIGHT, Disp: ,  "Rfl:     Lancets (OneTouch Delica Plus [id number]) misc, USE TO CHECK BLOOD GLUCOSE THREE TIMES DAILY AS NEEDED, Disp: 100 each, Rfl: 0    multivitamin with minerals tablet tablet, Take 1 tablet by mouth Daily., Disp: , Rfl:     Turmeric (QC Tumeric Complex) 500 MG capsule, Take  by mouth., Disp: , Rfl:   Allergies   Allergen Reactions    Amoxicillin-Pot Clavulanate Itching, Nausea Only, Other (See Comments), Rash and GI Intolerance    Latex Itching    Compazine [Prochlorperazine] Unknown - High Severity    Oxycodone-Acetaminophen Nausea Only    Penicillins Unknown - High Severity    Phenergan [Promethazine] Unknown - High Severity    Sulfamethoxazole-Trimethoprim Nausea Only    Zithromax [Azithromycin] Unknown - High Severity       Objective     Vital Signs  /78 (BP Location: Left arm, Patient Position: Sitting, Cuff Size: Adult)   Pulse 66   Temp 97.3 °F (36.3 °C)   Ht 153.7 cm (60.5\")   Wt 53.5 kg (118 lb)   SpO2 98%   BMI 22.67 kg/m²   Estimated body mass index is 22.67 kg/m² as calculated from the following:    Height as of this encounter: 153.7 cm (60.5\").    Weight as of this encounter: 53.5 kg (118 lb).    BMI is within normal parameters. No other follow-up for BMI required.      Physical Exam  Vitals reviewed.   Constitutional:       Appearance: Normal appearance. She is not ill-appearing.   HENT:      Head: Normocephalic and atraumatic.      Right Ear: Tympanic membrane, ear canal and external ear normal. There is no impacted cerumen.      Left Ear: Tympanic membrane, ear canal and external ear normal. There is no impacted cerumen.      Nose: Nose normal. No congestion or rhinorrhea.      Mouth/Throat:      Mouth: Mucous membranes are moist.      Pharynx: Oropharynx is clear. Uvula midline. No oropharyngeal exudate or posterior oropharyngeal erythema.      Tonsils: No tonsillar exudate or tonsillar abscesses. 0 on the right. 0 on the left.   Eyes:      Extraocular Movements: Extraocular " movements intact.      Conjunctiva/sclera: Conjunctivae normal.      Pupils: Pupils are equal, round, and reactive to light.   Neck:      Thyroid: No thyroid mass, thyromegaly or thyroid tenderness.   Cardiovascular:      Rate and Rhythm: Normal rate and regular rhythm.      Pulses: Normal pulses.           Radial pulses are 2+ on the right side and 2+ on the left side.      Heart sounds: Normal heart sounds, S1 normal and S2 normal. No murmur heard.  Pulmonary:      Effort: Pulmonary effort is normal. No tachypnea.      Breath sounds: Normal breath sounds and air entry. No decreased breath sounds, wheezing or rhonchi.   Abdominal:      General: Abdomen is flat. Bowel sounds are normal.      Palpations: Abdomen is soft.      Tenderness: There is no abdominal tenderness. There is no guarding or rebound.   Musculoskeletal:      Right lower leg: No edema.      Left lower leg: No edema.   Lymphadenopathy:      Cervical: No cervical adenopathy.   Skin:     General: Skin is warm and dry.      Capillary Refill: Capillary refill takes less than 2 seconds.   Neurological:      General: No focal deficit present.      Mental Status: She is alert and oriented to person, place, and time. Mental status is at baseline.      Sensory: Sensation is intact.      Motor: Motor function is intact.      Coordination: Coordination is intact.      Gait: Gait is intact.   Psychiatric:         Attention and Perception: Attention and perception normal.         Mood and Affect: Mood and affect normal.         Speech: Speech normal.         Behavior: Behavior normal. Behavior is cooperative.         Thought Content: Thought content normal.         Cognition and Memory: Cognition and memory normal.         Judgment: Judgment normal.                 Assessment and Plan     Diagnoses and all orders for this visit:    1. Trigeminal neuralgia (Primary)  -     carBAMazepine XR (TEGretol-XR) 100 MG 12 hr tablet; Take 1 tablet by mouth 2 (Two) Times a  Day.  Dispense: 60 tablet; Refill: 3  -     Ambulatory Referral to Neurology    2. Encounter for screening colonoscopy  -     Ambulatory Referral For Screening Colonoscopy    Tegretol refills sent  Neurology and colonoscopy referral in   Will f/u in 4 months to check A1c and lipids        If a referral was made please contact our office if you have not heard about an appointment in the next 2 weeks.   If labs or images are ordered we will contact you with the results within the next week.  If you have not heard from us after a week please call our office to inquire about the results.    Follow Up  Return in about 4 months (around 8/16/2024).    Jacquelyn Gamino, APRN

## 2024-04-17 ENCOUNTER — OFFICE VISIT (OUTPATIENT)
Dept: ENDOCRINOLOGY | Facility: CLINIC | Age: 64
End: 2024-04-17
Payer: MEDICAID

## 2024-04-17 VITALS
HEIGHT: 61 IN | SYSTOLIC BLOOD PRESSURE: 102 MMHG | WEIGHT: 118 LBS | HEART RATE: 76 BPM | BODY MASS INDEX: 22.28 KG/M2 | DIASTOLIC BLOOD PRESSURE: 64 MMHG | OXYGEN SATURATION: 98 %

## 2024-04-17 DIAGNOSIS — E78.5 HYPERLIPIDEMIA, UNSPECIFIED HYPERLIPIDEMIA TYPE: ICD-10-CM

## 2024-04-17 DIAGNOSIS — R73.03 PREDIABETES: Primary | ICD-10-CM

## 2024-04-17 LAB
EXPIRATION DATE: NORMAL
GLUCOSE BLDC GLUCOMTR-MCNC: 101 MG/DL (ref 70–130)
Lab: NORMAL

## 2024-04-17 PROCEDURE — 82947 ASSAY GLUCOSE BLOOD QUANT: CPT | Performed by: INTERNAL MEDICINE

## 2024-04-17 PROCEDURE — 99203 OFFICE O/P NEW LOW 30 MIN: CPT | Performed by: INTERNAL MEDICINE

## 2024-04-17 NOTE — PROGRESS NOTES
Chief Complaint   Patient presents with    Prediabetes     New patient referred by Federico JACKSON       HPI:   Melly Cantor is a 63 y.o.female new patient referred by RENETTA Hernandez for further evaluation of her prediabetes. Her history is as follows:    1) Prediabetes:  - Has had three children. Had gestational diabetes only with her third pregnancy which diet controlled.  - In 2019, was found to have a mildly elevated A1C% of 5.7%  - Her A1C% has ranged from 5.7% - 6.10% since 2019    She usually is able to keep her A1C% < 6.00 with dietary changes alone. She reported being less adherent to her diet over the holidays and her A1C% was 6.10 in 02/2024  - Is usually active and her weight has been stable.    FMHx: Pt's father had a h/o Type 2 DM, stroke. Pt's mother and brother had Type 2 DM    Other History:   - Had Neck US on 03/24/2023 ( Radiology) to evaluate an enlarged lymph node. Benign, reactive lymph nodes were favored.    2) hyperlipidemia, unspecified:  - pt has a h/o hyperlipidemia since at least 2022 per available records.   - Pt has tried statins in the past but did not tolerate. She cannot recall which ones she has tried    Lab Results   Component Value Date    CHOL 224 (H) 02/13/2024    CHOL 201 (H) 08/15/2023    CHOL 191 05/05/2023     Lab Results   Component Value Date    TRIG 97 02/13/2024    TRIG 108 08/15/2023    TRIG 69 05/05/2023     Lab Results   Component Value Date    HDL 59 02/13/2024    HDL 57 08/15/2023    HDL 51 05/05/2023     Lab Results   Component Value Date     (H) 02/13/2024     (H) 08/15/2023     (H) 05/05/2023         Review of Systems   Constitutional: Negative.    HENT: Negative.     Eyes: Negative.    Respiratory: Negative.     Cardiovascular: Negative.    Gastrointestinal: Negative.    Endocrine: Negative.    Genitourinary: Negative.    Musculoskeletal: Negative.    Skin: Negative.    Allergic/Immunologic: Negative.    Neurological:          Intermittent facial pain from trigeminal neuralgia   Hematological: Negative.    Psychiatric/Behavioral: Negative.         Past Medical History:   Diagnosis Date    Cataract     Hyperlipidemia     Prediabetes     Trigeminal neuralgia      family history includes Arthritis in her father and mother; Diabetes in her brother, father, mother, and sister; Heart attack in her father and mother; Hyperlipidemia in her brother, father, mother, and sister; Hypertension in her brother, father, and mother; Kidney cancer in her father; Kidney disease in her father; Stroke in her brother and father; Thyroid disease in her brother and sister.  Past Surgical History:   Procedure Laterality Date     SECTION      KNEE ARTHROSCOPY      TONSILLECTOMY      TUBAL ABDOMINAL LIGATION       Social History     Tobacco Use    Smoking status: Never    Smokeless tobacco: Never   Vaping Use    Vaping status: Never Used   Substance Use Topics    Alcohol use: Never    Drug use: Never     Outpatient Medications Prior to Visit   Medication Sig Dispense Refill    carBAMazepine XR (TEGretol-XR) 100 MG 12 hr tablet Take 1 tablet by mouth 2 (Two) Times a Day. 60 tablet 3    Cosopt PF 2-0.5 % solution USE 1 DROP INTO EACH EYE EVERY MORNING AND EVERY NIGHT AT BEDTIME      glucose blood test strip Use as instructed-use one daily  Dx e11.9 100 each 12    hydrOXYzine (ATARAX) 25 MG tablet Take 1 tablet by mouth Every 8 (Eight) Hours As Needed for Itching. Appointment needed for further refills. 90 tablet 1    Iyuzeh 0.005 % solution INSTILL 1 DROP INTO BOTH EYES AT NIGHT      Lancets (OneTouch Delica Plus [id number]) misc USE TO CHECK BLOOD GLUCOSE THREE TIMES DAILY AS NEEDED 100 each 0    multivitamin with minerals tablet tablet Take 1 tablet by mouth Daily.      Turmeric (QC Tumeric Complex) 500 MG capsule Take  by mouth.       No facility-administered medications prior to visit.     Allergies   Allergen Reactions    Amoxicillin-Pot  "Clavulanate Itching, Nausea Only, Other (See Comments), Rash and GI Intolerance    Latex Itching    Compazine [Prochlorperazine] Unknown - High Severity    Oxycodone-Acetaminophen Nausea Only    Penicillins Unknown - High Severity    Phenergan [Promethazine] Unknown - High Severity    Sulfamethoxazole-Trimethoprim Nausea Only    Zithromax [Azithromycin] Unknown - High Severity       /64 (BP Location: Left arm, Patient Position: Sitting, Cuff Size: Adult)   Pulse 76   Ht 153.7 cm (60.5\")   Wt 53.5 kg (118 lb)   SpO2 98%   BMI 22.67 kg/m²   Physical Exam  Vitals reviewed.   Constitutional:       General: She is not in acute distress.     Appearance: She is well-developed. She is not diaphoretic.   HENT:      Head: Normocephalic.   Eyes:      Conjunctiva/sclera: Conjunctivae normal.      Pupils: Pupils are equal, round, and reactive to light.   Neck:      Thyroid: No thyromegaly.      Trachea: No tracheal deviation.      Comments: No palpable thyroid nodules    Cardiovascular:      Rate and Rhythm: Normal rate and regular rhythm.      Heart sounds: Normal heart sounds. No murmur heard.  Pulmonary:      Effort: Pulmonary effort is normal. No respiratory distress.      Breath sounds: Normal breath sounds.   Abdominal:      General: Bowel sounds are normal.      Palpations: Abdomen is soft. There is no mass.      Tenderness: There is no abdominal tenderness.   Lymphadenopathy:      Cervical: No cervical adenopathy.   Skin:     General: Skin is warm and dry.      Findings: No erythema.      Comments: No acanthosis nigricans   Neurological:      Mental Status: She is alert and oriented to person, place, and time.      Cranial Nerves: No cranial nerve deficit.   Psychiatric:         Behavior: Behavior normal.         LABS/IMAGING: outside records reviewed and summarized in HPI  Results for orders placed or performed in visit on 04/17/24   POC Glucose, Blood    Specimen: Blood   Result Value Ref Range    Glucose " 101 70 - 130 mg/dL    Lot Number [id number]     Expiration Date 2024-10-17      Lab Results   Component Value Date    HGBA1C 6.10 (H) 02/13/2024       ASSESSMENT/PLAN:  1) prediabetes: currently diet controlled  - discussed that lifestyle modifications ( Behavior modification, Dietary therapy, Physical activity) is recommended for all patients with prediabetes.   - We also discussed that because of its effectiveness, low cost, and long-term safety, the ADA recommends consideration of metformin for prevention of diabetes in individuals at highest risk for developing diabetes, such as those with an A1C% >/= 6% who are < 60 years of age, and/or body mass index [BMI] ?35 kg/m2, and/or women with a history of gestational diabetes.  - She would like to work on her diet and exercise first and see if her A1C%  decreases back to < 6.0%     2) hyperlipidemia, unspecified:   - discussed that a low dose statin at this time is reasonable for primary prevention. She is understandably hesitant due to prior experience of side effects. Not clear which statin she was prescribed.   - Will discuss other possible options at her follow-up    Will have her RTC in 6 months    Electronically Signed: Violette Lopes MD

## 2024-05-14 ENCOUNTER — HOSPITAL ENCOUNTER (OUTPATIENT)
Dept: MAMMOGRAPHY | Facility: HOSPITAL | Age: 64
Discharge: HOME | End: 2024-05-14
Admitting: ADVANCED PRACTICE MIDWIFE
Payer: MEDICAID

## 2024-05-14 DIAGNOSIS — Z12.31 ENCOUNTER FOR SCREENING MAMMOGRAM FOR MALIGNANT NEOPLASM OF BREAST: ICD-10-CM

## 2024-05-14 PROCEDURE — 77067 SCR MAMMO BI INCL CAD: CPT

## 2024-05-14 PROCEDURE — 77063 BREAST TOMOSYNTHESIS BI: CPT

## 2024-05-17 ENCOUNTER — OFFICE VISIT (OUTPATIENT)
Dept: INTERNAL MEDICINE | Facility: CLINIC | Age: 64
End: 2024-05-17
Payer: MEDICAID

## 2024-05-17 VITALS
SYSTOLIC BLOOD PRESSURE: 104 MMHG | DIASTOLIC BLOOD PRESSURE: 68 MMHG | HEART RATE: 84 BPM | WEIGHT: 118 LBS | OXYGEN SATURATION: 99 % | BODY MASS INDEX: 22.28 KG/M2 | HEIGHT: 61 IN | RESPIRATION RATE: 18 BRPM | TEMPERATURE: 97.7 F

## 2024-05-17 DIAGNOSIS — J02.9 SORE THROAT: ICD-10-CM

## 2024-05-17 DIAGNOSIS — R05.1 ACUTE COUGH: Primary | ICD-10-CM

## 2024-05-17 DIAGNOSIS — B34.9 VIRAL INFECTION: ICD-10-CM

## 2024-05-17 PROCEDURE — 1126F AMNT PAIN NOTED NONE PRSNT: CPT | Performed by: NURSE PRACTITIONER

## 2024-05-17 PROCEDURE — 87428 SARSCOV & INF VIR A&B AG IA: CPT | Performed by: NURSE PRACTITIONER

## 2024-05-17 PROCEDURE — 99213 OFFICE O/P EST LOW 20 MIN: CPT | Performed by: NURSE PRACTITIONER

## 2024-05-17 PROCEDURE — 87880 STREP A ASSAY W/OPTIC: CPT | Performed by: NURSE PRACTITIONER

## 2024-05-17 NOTE — PROGRESS NOTES
Subjective   Melly Cantor is a 64 y.o. female.     Chief Complaint   Patient presents with    Sore Throat    Nasal Congestion    sneezing       PCP: Jacquelyn Gamino APRN    History of Present Illness /Review of systems    History of Present Illness  The patient presents for evaluation of sore throat, nasal congestion, and runny nose.    The patient reported awakening this morning with a mild sore throat accompanied by nasal congestion and rhinorrhea. She took Aleve the previous night for back pain, which has since resolved. Accompanying symptoms include nasal congestion, sneezing, and mild postnasal drip with green mucus. She denies experiencing fever, headache, chills, dyspnea, or wheezing. She has attempted to manage her symptoms with cough drops, honey syrup, tea, and soup. She denies any known exposure to sick individuals, although her son-in-law, who works at [employer]), may have contracted an illness. The patient's son-in-law was brought to the doctor on Tuesday for similar symptoms, including rhinorrhea and a low-grade fever of 101.6. His pediatrician attributed these symptoms to a mild summer virus and recommended good hand hygiene. The patient reported taking hydroxyzine 25 last night.   She is allergic to FLONASE.    Results  Laboratory Studies  Negative for COVID-19, flu, and strep.    Results for orders placed or performed in visit on 05/17/24   POCT SARS-CoV-2 Antigen JOSE M + Flu    Specimen: Swab   Result Value Ref Range    SARS Antigen Not Detected Not Detected, Presumptive Negative    Influenza A Antigen JOSE M Not Detected Not Detected    Influenza B Antigen JOSE M Not Detected Not Detected    Internal Control Passed Passed    Lot Number [id number]     Expiration Date 02/15/2025    POCT rapid strep A    Specimen: Swab   Result Value Ref Range    Rapid Strep A Screen Negative Negative, VALID, INVALID, Not Performed    Internal Control Passed Passed    Lot Number [id number]     Expiration Date  07/09/2025        The following portions of the patient's history were reviewed and updated as appropriate: allergies, current medications, past family history, past medical history, past social history, past surgical history and problem list.              Outpatient Medications Marked as Taking for the 5/17/24 encounter (Office Visit) with Trudi Augustine APRN   Medication Sig Dispense Refill    Ascorbic Acid (VITAMIN C PO) Take 2,000 Units by mouth Daily.      carBAMazepine XR (TEGretol-XR) 100 MG 12 hr tablet Take 1 tablet by mouth 2 (Two) Times a Day. 60 tablet 3    Cholecalciferol (VITAMIN D-3 PO) Take 4,000 Units by mouth Daily.      Cosopt PF 2-0.5 % solution USE 1 DROP INTO EACH EYE EVERY MORNING AND EVERY NIGHT AT BEDTIME      glucose blood test strip Use as instructed-use one daily  Dx e11.9 100 each 12    hydrOXYzine (ATARAX) 25 MG tablet Take 1 tablet by mouth Every 8 (Eight) Hours As Needed for Itching. Appointment needed for further refills. 90 tablet 1    Iyuzeh 0.005 % solution INSTILL 1 DROP INTO BOTH EYES AT NIGHT      Lancets (OneTouch Delica Plus [id number]) misc USE TO CHECK BLOOD GLUCOSE THREE TIMES DAILY AS NEEDED 100 each 0    multivitamin with minerals tablet tablet Take 1 tablet by mouth Daily.      Omega-3 Fatty Acids (OMEGA-3 FISH OIL PO) Take  by mouth.      Turmeric (QC Tumeric Complex) 500 MG capsule Take  by mouth.       Allergies   Allergen Reactions    Amoxicillin-Pot Clavulanate Itching, Nausea Only, Other (See Comments), Rash and GI Intolerance    Latex Itching    Compazine [Prochlorperazine] Unknown - High Severity    Flonase [Fluticasone] Other (See Comments)     Jittery     Oxycodone-Acetaminophen Nausea Only    Penicillins Unknown - High Severity    Phenergan [Promethazine] Unknown - High Severity    Sulfamethoxazole-Trimethoprim Nausea Only    Zithromax [Azithromycin] Unknown - High Severity           Objective     Vitals:    05/17/24 1115   BP: 104/68   BP Location: Left  "arm   Patient Position: Sitting   Cuff Size: Adult   Pulse: 84   Resp: 18   Temp: 97.7 °F (36.5 °C)   TempSrc: Infrared   SpO2: 99%   Weight: 53.5 kg (118 lb)   Height: 153.7 cm (60.5\")     Body mass index is 22.67 kg/m².  Wt Readings from Last 3 Encounters:   05/17/24 53.5 kg (118 lb)   04/17/24 53.5 kg (118 lb)   04/16/24 53.5 kg (118 lb)     Physical Exam  Constitutional:       Appearance: Normal appearance. She is well-developed.   HENT:      Head: Normocephalic and atraumatic.   Eyes:      General: No scleral icterus.     Conjunctiva/sclera: Conjunctivae normal.   Cardiovascular:      Rate and Rhythm: Normal rate and regular rhythm.      Heart sounds: Normal heart sounds.   Pulmonary:      Effort: Pulmonary effort is normal. No respiratory distress.      Breath sounds: Normal breath sounds.   Abdominal:      General: Bowel sounds are normal.      Palpations: Abdomen is soft.      Tenderness: There is no abdominal tenderness.   Musculoskeletal:         General: Normal range of motion.      Cervical back: Normal range of motion.      Right lower leg: No edema.      Left lower leg: No edema.   Skin:     General: Skin is warm and dry.   Neurological:      General: No focal deficit present.      Mental Status: She is alert and oriented to person, place, and time.   Psychiatric:         Attention and Perception: Attention normal.         Mood and Affect: Mood and affect normal.         Behavior: Behavior normal. Behavior is cooperative.         Thought Content: Thought content normal.         Cognition and Memory: Cognition normal.         Judgment: Judgment normal.               Assessment & Plan   Diagnoses and all orders for this visit:    1. Acute cough (Primary)  -     POCT SARS-CoV-2 Antigen JOSE M + Flu    2. Sore throat  -     POCT rapid strep A    3. Viral infection        Assessment & Plan  1. Pharyngitis, nasal congestion, and rhinorrhea.  The patient tested negative for COVID-19, influenza, and streptococcal " pharyngitis. The symptoms appear to be indicative of either allergies or a viral infection. The patient is advised to persist with her holistic treatment regimen. Over-the-counter medications such as Claritin, Allegra, Zyrtec, or Xyzal are recommended. Saline nasal spray is recommended for dryness as needed. A humidifier is recommended for home use, avoidance of dust and allergens, and maintain deep cleanliness of the household. Supportive care is recommended, including tea with honey for soothing effects of the throat, lozenges with lidocaine, and warm salt water gargles. At this juncture, an antibiotic is not deemed necessary.    BMI is within normal parameters. No other follow-up for BMI required.      Return if symptoms worsen or fail to improve.    I discussed my findings,recommendations, and plan of care was with the patient. They verbalized understanding and agreement.  Patient was encouraged to keep me informed of any acute changes, lack of improvement, or any new concerning symptoms.     Patient or patient representative verbalized consent for the use of Ambient Listening during the visit with  RENETTA Webber for chart documentation. 5/20/2024  21:32 EDT

## 2024-06-05 ENCOUNTER — APPOINTMENT (OUTPATIENT)
Dept: GENERAL RADIOLOGY | Facility: HOSPITAL | Age: 64
End: 2024-06-05
Payer: MEDICAID

## 2024-06-05 ENCOUNTER — HOSPITAL ENCOUNTER (EMERGENCY)
Facility: HOSPITAL | Age: 64
Discharge: HOME | End: 2024-06-05
Attending: EMERGENCY MEDICINE | Admitting: EMERGENCY MEDICINE
Payer: MEDICAID

## 2024-06-05 VITALS
SYSTOLIC BLOOD PRESSURE: 145 MMHG | HEART RATE: 74 BPM | BODY MASS INDEX: 22.47 KG/M2 | TEMPERATURE: 98.5 F | OXYGEN SATURATION: 98 % | HEIGHT: 61 IN | RESPIRATION RATE: 16 BRPM | DIASTOLIC BLOOD PRESSURE: 72 MMHG | WEIGHT: 119 LBS

## 2024-06-05 DIAGNOSIS — S63.502A SPRAIN OF LEFT WRIST, INITIAL ENCOUNTER: Primary | ICD-10-CM

## 2024-06-05 DIAGNOSIS — S80.01XA CONTUSION OF RIGHT KNEE, INITIAL ENCOUNTER: ICD-10-CM

## 2024-06-05 PROCEDURE — 73110 X-RAY EXAM OF WRIST: CPT

## 2024-06-05 PROCEDURE — 99283 EMERGENCY DEPT VISIT LOW MDM: CPT

## 2024-06-05 PROCEDURE — 73560 X-RAY EXAM OF KNEE 1 OR 2: CPT

## 2024-06-05 RX ORDER — IBUPROFEN 600 MG/1
600 TABLET ORAL EVERY 6 HOURS PRN
Qty: 24 TABLET | Refills: 0 | Status: SHIPPED | OUTPATIENT
Start: 2024-06-05

## 2024-06-05 NOTE — ED PROVIDER NOTES
EMERGENCY DEPARTMENT ENCOUNTER    Pt Name: Melly Cantor  MRN: [id number]  Pt :   1960  Room Number:  24SF/24  Date of encounter:  2024  PCP: Jacquelyn Gamino APRN  ED Provider: RENETTA Fay    Historian: Patient    HPI:  Chief Complaint:  Fall, Rt knee pain, Lt wrist pain    Context: Melly Cantor is a 64 y.o. female who presents to the ED c/o Rt knee pain, Lt wrist pain.  [place] [employer] and [place] and fell.  Patient tried to break her fall with her left hand\wrist.  Patient went down on her right knee.  She denies hitting her head or any loss of consciousness.  She denies neck pain or back pain.  Patient is able to ambulate.  Patient has abrasion to her right knee.  Patient reports pain with movement of her left wrist.  HPI     REVIEW OF SYSTEMS  A chief complaint appropriate review of systems was completed and is negative except as noted in the HPI.     PAST MEDICAL HISTORY  Past Medical History:   Diagnosis Date    Cataract     Hyperlipidemia     Prediabetes     Trigeminal neuralgia        PAST SURGICAL HISTORY  Past Surgical History:   Procedure Laterality Date     SECTION      KNEE ARTHROSCOPY  2003    TONSILLECTOMY      TUBAL ABDOMINAL LIGATION         FAMILY HISTORY  Family History   Problem Relation Age of Onset    Hyperlipidemia Mother     Hypertension Mother     Diabetes Mother     Arthritis Mother     Heart attack Mother     Stroke Father     Kidney disease Father     Hyperlipidemia Father     Hypertension Father     Heart attack Father     Diabetes Father     Arthritis Father     Kidney cancer Father     Thyroid disease Sister     Hyperlipidemia Sister     Diabetes Sister     Thyroid disease Brother     Stroke Brother     Hyperlipidemia Brother     Hypertension Brother     Diabetes Brother     Breast cancer Maternal Aunt     Ovarian cancer Neg Hx        SOCIAL HISTORY  Social History     Socioeconomic History    Marital  status:    Tobacco Use    Smoking status: Never    Smokeless tobacco: Never   Vaping Use    Vaping status: Never Used   Substance and Sexual Activity    Alcohol use: Never    Drug use: Never    Sexual activity: Defer       ALLERGIES  Amoxicillin-pot clavulanate, Latex, Compazine [prochlorperazine], Flonase [fluticasone], Oxycodone-acetaminophen, Penicillins, Phenergan [promethazine], Sulfamethoxazole-trimethoprim, and Zithromax [azithromycin]    PHYSICAL EXAM  Physical Exam  Vitals and nursing note reviewed.   Constitutional:       General: She is not in acute distress.     Appearance: Normal appearance. She is not ill-appearing.   HENT:      Head: Normocephalic and atraumatic.      Nose: Nose normal.      Mouth/Throat:      Mouth: Mucous membranes are moist.   Eyes:      Extraocular Movements: Extraocular movements intact.      Pupils: Pupils are equal, round, and reactive to light.   Cardiovascular:      Rate and Rhythm: Normal rate and regular rhythm.      Pulses: Normal pulses.      Heart sounds: Normal heart sounds.   Pulmonary:      Effort: Pulmonary effort is normal. No respiratory distress.      Breath sounds: Normal breath sounds.   Musculoskeletal:      Left wrist: Tenderness present. Decreased range of motion. Normal pulse.      Cervical back: Normal range of motion and neck supple. No tenderness. Normal range of motion.      Thoracic back: No tenderness. Normal range of motion.      Lumbar back: No tenderness. Normal range of motion.      Right knee: Normal range of motion. Tenderness (anterior RT knee pain) present.   Skin:     General: Skin is warm and dry.   Neurological:      General: No focal deficit present.      Mental Status: She is alert and oriented to person, place, and time.   Psychiatric:         Mood and Affect: Mood normal.         Behavior: Behavior normal.           LAB RESULTS  Results for orders placed or performed in visit on 05/17/24   POCT SARS-CoV-2 Antigen JOSE M + Flu     Specimen: Swab   Result Value Ref Range    SARS Antigen Not Detected Not Detected, Presumptive Negative    Influenza A Antigen JOSE M Not Detected Not Detected    Influenza B Antigen JOSE M Not Detected Not Detected    Internal Control Passed Passed    Lot Number [id number]     Expiration Date 02/15/2025    POCT rapid strep A    Specimen: Swab   Result Value Ref Range    Rapid Strep A Screen Negative Negative, VALID, INVALID, Not Performed    Internal Control Passed Passed    Lot Number [id number]     Expiration Date 07/09/2025        If labs were ordered, I independently reviewed the results and considered them in treating the patient.    RADIOLOGY  XR Wrist 3+ View Left   Final Result   Impression:   No acute fracture or malalignment.         Electronically Signed: Germán Vasquez MD     6/5/2024 3:02 PM EDT     Workstation ID: [id number]      XR Knee 1 or 2 View Right   Final Result   Impression:   Degenerative osteoarthritis. No acute process.         Electronically Signed: Hannah Mcknight MD     6/5/2024 3:04 PM EDT     Workstation ID: [id number]        [] Radiologist's Report Reviewed:  I ordered and independently interpreted the above noted radiographic studies.  See radiologist's dictation for official interpretation.      PROCEDURES    Procedures    No orders to display       MEDICATIONS GIVEN IN ER    Medications - No data to display    MEDICAL DECISION MAKING, PROGRESS, and CONSULTS   Medical Decision Making  Melly Cantor is a 64 y.o. female who presents to the ED c/o Rt knee pain, Lt wrist pain.  Pt was walking in the [facility] and slipped and fell.  Patient tried to break her fall with her left hand\wrist.  Patient went down on her right knee.  She denies hitting her head or any loss of consciousness.  She denies neck pain or back pain.  Patient is able to ambulate.  Patient has abrasion to her right knee.  Patient reports pain with movement of her left wrist.    Problems Addressed:  Contusion of right knee,  initial encounter: complicated acute illness or injury     Details: Imaging is negative for acute findings.  Sprain of left wrist, initial encounter: complicated acute illness or injury     Details: Imaging is negative for acute findings.    Amount and/or Complexity of Data Reviewed  Radiology: ordered.    Risk  Prescription drug management.        All labs have been independently reviewed by me.  All radiology studies have been interpreted by me and the radiologist dictating the report.  All EKG's have been independently interpreted by me as well as and overseeing attending physician.    [] Discussed with radiology regarding test interpretation:    Discussion below represents my analysis of pertinent findings related to patient's condition, differential diagnosis, treatment plan and final disposition.    Differential diagnosis:  The differential diagnosis associated with the patient's presentation includes: Fracture, dislocation, sprain, strain, contusion    Additional sources  Discussed/ obtained information from independent historians:   [] Spouse  [] Parent  [] Family member  [] Friend  [] EMS   [] Other:  External (non-ED) record review:   [] Inpatient record:   [] Office record:   [] Outpatient record:   [] Prior Outpatient labs:   [] Prior Outpatient radiology:   [] Primary Care record:   [] Outside ED record:   [] Other:   Patient's care impacted by:   [x] Diabetes  [] Hypertension  [x] Hyperlipidemia  [] Hypothyroidism   [] Coronary Artery Disease   [] COPD   [] Cancer   [] Obesity  [] GERD   [] Tobacco Abuse   [] Substance Abuse    [] Anxiety   [] Depression   [] Other:   Care significantly affected by Social Determinants of Health (housing and economic circumstances, unemployment)    [] Yes     [x] No   If yes, Patient's care significantly limited by  Social Determinants of Health including:   [] Inadequate housing   [] Low income   [] Alcoholism and drug addiction in family   [] Problems related to  primary support group   [] Unemployment   [] Problems related to employment   [] Other Social Determinants of Health:     Shared decision making: We will discharge patient home.  Patient to rest, ice, compression elevate extremities.  Patient to alternate Tylenol and ibuprofen.  Patient to follow-up with primary care as needed.  Patient agrees and verbalized understanding.    Orders placed during this visit:  Orders Placed This Encounter   Procedures    XR Wrist 3+ View Left    XR Knee 1 or 2 View Right       I considered prescription management  with:   [] Pain medication  [] Antiviral  [] Antibiotic   [] Other:   Rationale:  Additional orders considered but not ordered:  The following testing was considered but ultimately not selected after discussion with patient/family:    ED Course:    ED Course as of 06/05/24 1730   Wed Jun 05, 2024   1527 Impression:  Degenerative osteoarthritis. No acute process.      [KG]   1527 Impression:  No acute fracture or malalignment.   [KG]      ED Course User Index  [KG] Rosario Pryor, RENETTA            DIAGNOSIS  Final diagnoses:   Sprain of left wrist, initial encounter   Contusion of right knee, initial encounter       DISPOSITION    DISCHARGE    Patient discharged in stable condition.    Reviewed implications of results, diagnosis, meds, responsibility to follow up, warning signs and symptoms of possible worsening, potential complications and reasons to return to ER.    Patient/Family voiced understanding of above instructions.    Discussed plan for discharge, as there is no emergent indication for admission.  Pt/family is agreeable and understands need for follow up and possible repeat testing.  Pt/family is aware that discharge does not mean that nothing is wrong but that it indicates no emergency is currently present that requires admission and they must continue care with follow-up as given below or with a physician of their choice.     FOLLOW-UP  Jacquelyn Gamino  Shonda, APRN  [address]  [address]  [facility] [address]  [phone]          Rosendo Fernandes MD  [address]  [address]  [facility] [id number] [phone]               Medication List        New Prescriptions      ibuprofen 600 MG tablet  Commonly known as: ADVIL,MOTRIN  Take 1 tablet by mouth Every 6 (Six) Hours As Needed for Moderate Pain.               Where to Get Your Medications        These medications were sent to [id number] - [place], [place] - [address] AT [facility] [address] [facility] [phone] [phone]   [address], [facility] [address]      Phone: [phone]   ibuprofen 600 MG tablet          ED Disposition       ED Disposition   Discharge    Condition   Stable    Comment   --               Please note that portions of this document were completed with voice recognition software.       Rosario Pryor, APRN  06/05/24 1990

## 2024-07-26 DIAGNOSIS — R73.09 ELEVATED GLUCOSE: ICD-10-CM

## 2024-07-26 DIAGNOSIS — L50.9 HIVES: ICD-10-CM

## 2024-07-26 RX ORDER — HYDROXYZINE HYDROCHLORIDE 25 MG/1
25 TABLET, FILM COATED ORAL EVERY 8 HOURS PRN
Qty: 30 TABLET | Refills: 0 | Status: SHIPPED | OUTPATIENT
Start: 2024-07-26 | End: 2024-08-05 | Stop reason: SDUPTHER

## 2024-07-26 NOTE — TELEPHONE ENCOUNTER
Caller: Melly Cantor    Relationship: Self    Best call back number: [phone]     Requested Prescriptions:   Requested Prescriptions     Pending Prescriptions Disp Refills    hydrOXYzine (ATARAX) 25 MG tablet 90 tablet 1     Sig: Take 1 tablet by mouth Every 8 (Eight) Hours As Needed for Itching. Appointment needed for further refills.    glucose blood test strip 100 each 12     Sig: Use as instructed-use one daily  Dx e11.9    Lancets (OneTouch Delica Plus [id number]) misc 100 each 0        Pharmacy where request should be sent: [id number] - [facility] [address] AT [facility] [address] [facility] [phone] [facility] [phone] FX     Last office visit with prescribing clinician: 4/16/2024   Last telemedicine visit with prescribing clinician: Visit date not found   Next office visit with prescribing clinician: 8/15/2024     Does the patient have less than a 3 day supply:  [x] Yes  [] No    Would you like a call back once the refill request has been completed: [] Yes [x] No    If the office needs to give you a call back, can they leave a voicemail: [] Yes [x] No    Cici Penn Rep   07/26/24 13:48 EDT

## 2024-07-26 NOTE — TELEPHONE ENCOUNTER
Rx Refill Note  Requested Prescriptions     Pending Prescriptions Disp Refills    hydrOXYzine (ATARAX) 25 MG tablet 30 tablet 0     Sig: Take 1 tablet by mouth Every 8 (Eight) Hours As Needed for Itching. Appointment needed for further refills.    glucose blood test strip 100 each 0     Sig: Use as instructed-use one daily  Dx e11.9    Lancets (OneTouch Delica Plus [id number]) misc 100 each 0      Last office visit with prescribing clinician: 4/16/2024     Next office visit with prescribing clinician: 8/15/2024     Nilam Shafer  07/26/24, 15:45 EDT

## 2024-07-29 RX ORDER — LANCETS 33 GAUGE
1 EACH MISCELLANEOUS 3 TIMES DAILY PRN
Qty: 100 EACH | Refills: 0 | Status: SHIPPED | OUTPATIENT
Start: 2024-07-29

## 2024-08-05 ENCOUNTER — OFFICE VISIT (OUTPATIENT)
Dept: INTERNAL MEDICINE | Facility: CLINIC | Age: 64
End: 2024-08-05
Payer: MEDICAID

## 2024-08-05 VITALS
TEMPERATURE: 97.1 F | WEIGHT: 120.4 LBS | HEART RATE: 68 BPM | OXYGEN SATURATION: 97 % | SYSTOLIC BLOOD PRESSURE: 108 MMHG | RESPIRATION RATE: 14 BRPM | HEIGHT: 61 IN | BODY MASS INDEX: 22.73 KG/M2 | DIASTOLIC BLOOD PRESSURE: 74 MMHG

## 2024-08-05 DIAGNOSIS — L50.9 HIVES: ICD-10-CM

## 2024-08-05 DIAGNOSIS — M25.561 ACUTE PAIN OF RIGHT KNEE: Primary | ICD-10-CM

## 2024-08-05 PROCEDURE — 1160F RVW MEDS BY RX/DR IN RCRD: CPT

## 2024-08-05 PROCEDURE — 1126F AMNT PAIN NOTED NONE PRSNT: CPT

## 2024-08-05 PROCEDURE — 99214 OFFICE O/P EST MOD 30 MIN: CPT

## 2024-08-05 PROCEDURE — 1159F MED LIST DOCD IN RCRD: CPT

## 2024-08-05 RX ORDER — HYDROXYZINE HYDROCHLORIDE 25 MG/1
25 TABLET, FILM COATED ORAL EVERY 8 HOURS PRN
Qty: 30 TABLET | Refills: 0 | Status: SHIPPED | OUTPATIENT
Start: 2024-08-05

## 2024-08-05 RX ORDER — IBUPROFEN 600 MG/1
600 TABLET ORAL EVERY 6 HOURS PRN
Qty: 24 TABLET | Refills: 0 | Status: SHIPPED | OUTPATIENT
Start: 2024-08-05

## 2024-08-05 NOTE — PROGRESS NOTES
Office Note     Name: Melly Cantor    : 1960     MRN: [id number]     Chief Complaint  Knee Injury (Fell on right knee/Would like Xray /Had fallen 8 weeks ago and fell again 24)    Subjective     History of Present Illness:  History of Present Illness      Melly Cantor is a 64 y.o. female who presents today for right knee injury about 2 months ago. States she slipped and fell onto R knee, went to ED and xrays were negative.    Last week she was walking her dog and her dog tripped her with the leash and she fell again onto her R knee.  She denies feeling any pop, states there was some mild swelling and tenderness.  She has been icing and taking ibuprofen as needed for pain.  She is requesting x-rays just to make sure that there is no new injury.    She is also requesting a refill on her hydroxyzine that she uses for hives      Past Medical History:   Diagnosis Date    Cataract     Hyperlipidemia     Prediabetes     Trigeminal neuralgia      Past Surgical History:   Procedure Laterality Date     SECTION      KNEE ARTHROSCOPY      TONSILLECTOMY      TUBAL ABDOMINAL LIGATION         Current Outpatient Medications:     Ascorbic Acid (VITAMIN C PO), Take 2,000 Units by mouth Daily., Disp: , Rfl:     carBAMazepine XR (TEGretol-XR) 100 MG 12 hr tablet, Take 1 tablet by mouth 2 (Two) Times a Day., Disp: 60 tablet, Rfl: 3    Cholecalciferol (VITAMIN D-3 PO), Take 4,000 Units by mouth Daily., Disp: , Rfl:     Cosopt PF 2-0.5 % solution, USE 1 DROP INTO EACH EYE EVERY MORNING AND EVERY NIGHT AT BEDTIME, Disp: , Rfl:     glucose blood test strip, Use as instructed-use one daily Dx e11.9, Disp: 100 each, Rfl: 0    hydrOXYzine (ATARAX) 25 MG tablet, Take 1 tablet by mouth Every 8 (Eight) Hours As Needed for Itching. Appointment needed for further refills., Disp: 30 tablet, Rfl: 0    ibuprofen (ADVIL,MOTRIN) 600 MG tablet, Take 1 tablet by mouth Every 6 (Six) Hours As Needed for Moderate  "Pain., Disp: 24 tablet, Rfl: 0    Iyuzeh 0.005 % solution, INSTILL 1 DROP INTO BOTH EYES AT NIGHT, Disp: , Rfl:     Lancets (OneTouch Delica Plus [id number]) misc, Inject 1 Lancet under the skin into the appropriate area as directed 3 (Three) Times a Day As Needed (use to check blood glucose)., Disp: 100 each, Rfl: 0    multivitamin with minerals tablet tablet, Take 1 tablet by mouth Daily., Disp: , Rfl:     Omega-3 Fatty Acids (OMEGA-3 FISH OIL PO), Take  by mouth., Disp: , Rfl:     Turmeric (QC Tumeric Complex) 500 MG capsule, Take  by mouth., Disp: , Rfl:   Allergies   Allergen Reactions    Amoxicillin-Pot Clavulanate Itching, Nausea Only, Other (See Comments), Rash and GI Intolerance    Latex Itching    Compazine [Prochlorperazine] Unknown - High Severity    Flonase [Fluticasone] Other (See Comments)     Jittery     Oxycodone-Acetaminophen Nausea Only    Penicillins Unknown - High Severity    Phenergan [Promethazine] Unknown - High Severity    Sulfamethoxazole-Trimethoprim Nausea Only    Zithromax [Azithromycin] Unknown - High Severity       Objective     Vital Signs  /74 (BP Location: Left arm, Patient Position: Sitting, Cuff Size: Adult)   Pulse 68   Temp 97.1 °F (36.2 °C) (Infrared)   Resp 14   Ht 154.9 cm (60.98\")   Wt 54.6 kg (120 lb 6.4 oz)   SpO2 97%   BMI 22.76 kg/m²   Estimated body mass index is 22.76 kg/m² as calculated from the following:    Height as of this encounter: 154.9 cm (60.98\").    Weight as of this encounter: 54.6 kg (120 lb 6.4 oz).    BMI is within normal parameters. No other follow-up for BMI required.      Physical Exam    Physical Exam  Constitutional:       Appearance: Normal appearance. She is not ill-appearing.   Cardiovascular:      Rate and Rhythm: Normal rate.      Pulses: Normal pulses.   Pulmonary:      Effort: Pulmonary effort is normal. No respiratory distress.   Musculoskeletal:      Right knee: Swelling (mild) present. No deformity, erythema or bony tenderness. " Normal range of motion. No tenderness. No LCL laxity, MCL laxity, ACL laxity or PCL laxity. Normal pulse.      Left knee: Normal.   Neurological:      General: No focal deficit present.      Mental Status: She is alert and oriented to person, place, and time. Mental status is at baseline.          Results           Assessment and Plan     Diagnoses and all orders for this visit:    1. Acute pain of right knee (Primary)  -     XR Knee 1 or 2 View Right; Future  -     Ambulatory Referral to Physical Therapy  -     ibuprofen (ADVIL,MOTRIN) 600 MG tablet; Take 1 tablet by mouth Every 6 (Six) Hours As Needed for Moderate Pain.  Dispense: 24 tablet; Refill: 0    2. Hives  -     hydrOXYzine (ATARAX) 25 MG tablet; Take 1 tablet by mouth Every 8 (Eight) Hours As Needed for Itching. Appointment needed for further refills.  Dispense: 30 tablet; Refill: 0    Knee x-rays ordered  PT referral in to evaluate and treat  Can use ibuprofen as needed for pain.  Continue to ice and elevate  Hydroxyzine refill sent to pharmacy  Assessment & Plan      If a referral was made please contact our office if you have not heard about an appointment in the next 2 weeks.   If labs or images are ordered we will contact you with the results within the next week.  If you have not heard from us after a week please call our office to inquire about the results.    Follow Up  No follow-ups on file.    RENETTA Lorenzo

## 2024-08-06 ENCOUNTER — HOSPITAL ENCOUNTER (OUTPATIENT)
Dept: GENERAL RADIOLOGY | Facility: HOSPITAL | Age: 64
Discharge: HOME | End: 2024-08-06
Payer: MEDICAID

## 2024-08-06 DIAGNOSIS — M25.561 ACUTE PAIN OF RIGHT KNEE: ICD-10-CM

## 2024-08-06 PROCEDURE — 73560 X-RAY EXAM OF KNEE 1 OR 2: CPT

## 2024-08-08 ENCOUNTER — TELEPHONE (OUTPATIENT)
Dept: INTERNAL MEDICINE | Facility: CLINIC | Age: 64
End: 2024-08-08

## 2024-08-08 NOTE — TELEPHONE ENCOUNTER
Caller: Bala Cantor    Relationship: Self    Best call back number: [phone]     Caller requesting test results: BALA    What test was performed: X RAY    When was the test performed: 8/6/24    Additional notes: PLEASE ADVISE WHEN RESULTS HAVE BEEN REVIEWED

## 2024-08-22 ENCOUNTER — OFFICE VISIT (OUTPATIENT)
Dept: INTERNAL MEDICINE | Facility: CLINIC | Age: 64
End: 2024-08-22
Payer: MEDICAID

## 2024-08-22 VITALS
BODY MASS INDEX: 22.66 KG/M2 | DIASTOLIC BLOOD PRESSURE: 84 MMHG | HEART RATE: 86 BPM | OXYGEN SATURATION: 99 % | RESPIRATION RATE: 16 BRPM | SYSTOLIC BLOOD PRESSURE: 132 MMHG | HEIGHT: 61 IN | TEMPERATURE: 97.7 F | WEIGHT: 120 LBS

## 2024-08-22 DIAGNOSIS — J02.9 SORE THROAT: ICD-10-CM

## 2024-08-22 DIAGNOSIS — R09.81 HEAD CONGESTION: Primary | ICD-10-CM

## 2024-08-22 PROCEDURE — 1126F AMNT PAIN NOTED NONE PRSNT: CPT

## 2024-08-22 PROCEDURE — 1160F RVW MEDS BY RX/DR IN RCRD: CPT

## 2024-08-22 PROCEDURE — 99213 OFFICE O/P EST LOW 20 MIN: CPT

## 2024-08-22 PROCEDURE — 87428 SARSCOV & INF VIR A&B AG IA: CPT

## 2024-08-22 PROCEDURE — 1159F MED LIST DOCD IN RCRD: CPT

## 2024-08-22 PROCEDURE — 87880 STREP A ASSAY W/OPTIC: CPT

## 2024-08-22 RX ORDER — GUAIFENESIN 600 MG/1
1200 TABLET, EXTENDED RELEASE ORAL 2 TIMES DAILY
Qty: 30 TABLET | Refills: 0 | Status: SHIPPED | OUTPATIENT
Start: 2024-08-22

## 2024-08-22 NOTE — PROGRESS NOTES
Office Note     Name: Melly Cantor    : 1960     MRN: [id number]     Chief Complaint  Sore Throat (Started yesterday /Hurts to swallow ), Nasal Congestion, Generalized Body Aches, and Chills    Subjective     History of Present Illness:  History of Present Illness      Melly Cantor is a 64 y.o. female who presents today for sore throat, body aches, chills, mild congestion & rhinorrhea. Symptoms started yesterday and worsened throughout the day. Denies cough or shortness of breath.  States her grandson started  and she believes she picked something up from him.  He was tested multiple times for flu/COVID and strep and all were negative.    She has been gargling salt water and taking ibuprofen for her symptoms which have helped her.       Past Medical History:   Diagnosis Date    Cataract     Hyperlipidemia     Prediabetes     Trigeminal neuralgia      Past Surgical History:   Procedure Laterality Date     SECTION      KNEE ARTHROSCOPY      TONSILLECTOMY      TUBAL ABDOMINAL LIGATION         Current Outpatient Medications:     Ascorbic Acid (VITAMIN C PO), Take 2,000 Units by mouth Daily., Disp: , Rfl:     carBAMazepine XR (TEGretol-XR) 100 MG 12 hr tablet, Take 1 tablet by mouth 2 (Two) Times a Day., Disp: 60 tablet, Rfl: 3    Cholecalciferol (VITAMIN D-3 PO), Take 4,000 Units by mouth Daily., Disp: , Rfl:     Cosopt PF 2-0.5 % solution, USE 1 DROP INTO EACH EYE EVERY MORNING AND EVERY NIGHT AT BEDTIME, Disp: , Rfl:     glucose blood test strip, Use as instructed-use one daily Dx e11.9, Disp: 100 each, Rfl: 0    hydrOXYzine (ATARAX) 25 MG tablet, Take 1 tablet by mouth Every 8 (Eight) Hours As Needed for Itching. Appointment needed for further refills., Disp: 30 tablet, Rfl: 0    ibuprofen (ADVIL,MOTRIN) 600 MG tablet, Take 1 tablet by mouth Every 6 (Six) Hours As Needed for Moderate Pain., Disp: 24 tablet, Rfl: 0    Iyuzeh 0.005 % solution, INSTILL 1 DROP INTO BOTH EYES  "AT NIGHT, Disp: , Rfl:     Lancets (OneTouch Delica Plus [id number]) misc, Inject 1 Lancet under the skin into the appropriate area as directed 3 (Three) Times a Day As Needed (use to check blood glucose)., Disp: 100 each, Rfl: 0    multivitamin with minerals tablet tablet, Take 1 tablet by mouth Daily., Disp: , Rfl:     Omega-3 Fatty Acids (OMEGA-3 FISH OIL PO), Take  by mouth., Disp: , Rfl:     Turmeric (QC Tumeric Complex) 500 MG capsule, Take  by mouth., Disp: , Rfl:     guaiFENesin (MUCINEX) 600 MG 12 hr tablet, Take 2 tablets by mouth 2 (Two) Times a Day., Disp: 30 tablet, Rfl: 0  Allergies   Allergen Reactions    Amoxicillin-Pot Clavulanate Itching, Nausea Only, Other (See Comments), Rash and GI Intolerance    Latex Itching    Compazine [Prochlorperazine] Unknown - High Severity    Flonase [Fluticasone] Other (See Comments)     Jittery     Oxycodone-Acetaminophen Nausea Only    Penicillins Unknown - High Severity    Phenergan [Promethazine] Unknown - High Severity    Sulfamethoxazole-Trimethoprim Nausea Only    Zithromax [Azithromycin] Unknown - High Severity       Objective     Vital Signs  /84 (BP Location: Left arm, Patient Position: Sitting, Cuff Size: Adult)   Pulse 86   Temp 97.7 °F (36.5 °C) (Infrared)   Resp 16   Ht 154.9 cm (60.98\")   Wt 54.4 kg (120 lb)   SpO2 99%   BMI 22.69 kg/m²   Estimated body mass index is 22.69 kg/m² as calculated from the following:    Height as of this encounter: 154.9 cm (60.98\").    Weight as of this encounter: 54.4 kg (120 lb).    BMI is within normal parameters. No other follow-up for BMI required.      Physical Exam    Physical Exam  Constitutional:       Appearance: She is ill-appearing.   HENT:      Right Ear: Hearing, tympanic membrane, ear canal and external ear normal.      Left Ear: Hearing, tympanic membrane, ear canal and external ear normal.      Nose: Rhinorrhea present.      Mouth/Throat:      Pharynx: Posterior oropharyngeal erythema present. No " oropharyngeal exudate.      Tonsils: No tonsillar exudate or tonsillar abscesses.   Eyes:      Extraocular Movements: Extraocular movements intact.      Conjunctiva/sclera: Conjunctivae normal.      Pupils: Pupils are equal, round, and reactive to light.   Cardiovascular:      Rate and Rhythm: Normal rate and regular rhythm.      Pulses: Normal pulses.      Heart sounds: No murmur heard.  Pulmonary:      Effort: Pulmonary effort is normal. No respiratory distress.      Breath sounds: No wheezing.   Abdominal:      Palpations: Abdomen is soft.      Tenderness: There is no abdominal tenderness.   Neurological:      General: No focal deficit present.      Mental Status: She is alert and oriented to person, place, and time. Mental status is at baseline.   Psychiatric:         Mood and Affect: Mood normal.         Behavior: Behavior normal.         Thought Content: Thought content normal.         Judgment: Judgment normal.          Results      Results for orders placed or performed in visit on 08/22/24   POCT SARS-CoV-2 Antigen JOSE M + Flu    Specimen: Swab   Result Value Ref Range    SARS Antigen Not Detected Not Detected, Presumptive Negative    Influenza A Antigen JOSE M Not Detected Not Detected    Influenza B Antigen JOSE M Not Detected Not Detected    Internal Control Passed Passed    Lot Number [id number]     Expiration Date 05-    POCT rapid strep A    Specimen: Swab   Result Value Ref Range    Rapid Strep A Screen Negative Negative, VALID, INVALID, Not Performed    Internal Control Passed Passed    Lot Number [id number]     Expiration Date 07-               Assessment and Plan     Diagnoses and all orders for this visit:    1. Head congestion (Primary)  -     guaiFENesin (MUCINEX) 600 MG 12 hr tablet; Take 2 tablets by mouth 2 (Two) Times a Day.  Dispense: 30 tablet; Refill: 0  -     POCT SARS-CoV-2 Antigen JOSE M + Flu  -     POCT rapid strep A    2. Sore throat  -     POCT SARS-CoV-2 Antigen JOSE M + Flu  -      POCT rapid strep A    POC flu/COVID and strep negative  Continue taking ibuprofen as needed for pain/body aches  Warm salt water gargles for throat discomfort  Will send Mucinex to help with congestion  Increase fluid intake/rest  RTC if symptoms do not improve over the next several days or worsen.  Assessment & Plan      If a referral was made please contact our office if you have not heard about an appointment in the next 2 weeks.   If labs or images are ordered we will contact you with the results within the next week.  If you have not heard from us after a week please call our office to inquire about the results.    Follow Up  Return if symptoms worsen or fail to improve.    Jacquelyn Gamino, APRN

## 2024-08-26 ENCOUNTER — OFFICE VISIT (OUTPATIENT)
Dept: INTERNAL MEDICINE | Facility: CLINIC | Age: 64
End: 2024-08-26
Payer: MEDICAID

## 2024-08-26 ENCOUNTER — TELEPHONE (OUTPATIENT)
Dept: INTERNAL MEDICINE | Facility: CLINIC | Age: 64
End: 2024-08-26

## 2024-08-26 VITALS
HEART RATE: 102 BPM | RESPIRATION RATE: 16 BRPM | OXYGEN SATURATION: 100 % | DIASTOLIC BLOOD PRESSURE: 88 MMHG | TEMPERATURE: 98 F | HEIGHT: 61 IN | SYSTOLIC BLOOD PRESSURE: 142 MMHG | BODY MASS INDEX: 22.66 KG/M2 | WEIGHT: 120 LBS

## 2024-08-26 DIAGNOSIS — R09.81 HEAD CONGESTION: ICD-10-CM

## 2024-08-26 DIAGNOSIS — R05.1 ACUTE COUGH: Primary | ICD-10-CM

## 2024-08-26 DIAGNOSIS — R50.9 FEVER, UNSPECIFIED FEVER CAUSE: Primary | ICD-10-CM

## 2024-08-26 DIAGNOSIS — J02.9 SORE THROAT: ICD-10-CM

## 2024-08-26 PROCEDURE — 1160F RVW MEDS BY RX/DR IN RCRD: CPT

## 2024-08-26 PROCEDURE — 1159F MED LIST DOCD IN RCRD: CPT

## 2024-08-26 PROCEDURE — 99214 OFFICE O/P EST MOD 30 MIN: CPT

## 2024-08-26 PROCEDURE — 87880 STREP A ASSAY W/OPTIC: CPT

## 2024-08-26 PROCEDURE — 87428 SARSCOV & INF VIR A&B AG IA: CPT

## 2024-08-26 PROCEDURE — 1126F AMNT PAIN NOTED NONE PRSNT: CPT

## 2024-08-26 RX ORDER — BROMPHENIRAMINE MALEATE, PSEUDOEPHEDRINE HYDROCHLORIDE, AND DEXTROMETHORPHAN HYDROBROMIDE 2; 30; 10 MG/5ML; MG/5ML; MG/5ML
5 SYRUP ORAL 4 TIMES DAILY PRN
Qty: 120 ML | Refills: 0 | Status: SHIPPED | OUTPATIENT
Start: 2024-08-26 | End: 2024-09-01

## 2024-08-26 RX ORDER — DOXYCYCLINE 100 MG/1
100 CAPSULE ORAL 2 TIMES DAILY
Qty: 20 CAPSULE | Refills: 0 | Status: SHIPPED | OUTPATIENT
Start: 2024-08-26

## 2024-08-26 NOTE — PROGRESS NOTES
Office Note     Name: Melly Cantor    : 1960     MRN: [id number]     Chief Complaint  Nasal Congestion (Started yesterday with Nyquil /), Fever, Chills, Sore Throat (Has been gargling salt water ), and Cough    Subjective     History of Present Illness:  History of Present Illness      Melly Cantor is a 64 y.o. female who presents today for worsening congestion, fever, chills, sore throat and body aches. She has now developed a dry cough and hoarseness.  She denies chest pain/tightness or shortness of breath.    Was seen in clinic on 2024 when symptoms initially started and was instructed to RTC if symptoms worsened or did not improve.  She has been taking Tylenol/ibuprofen and did take some Mucinex which only gave her minor relief.      Past Medical History:   Diagnosis Date    Cataract     Hyperlipidemia     Prediabetes     Trigeminal neuralgia      Past Surgical History:   Procedure Laterality Date     SECTION      KNEE ARTHROSCOPY      TONSILLECTOMY      TUBAL ABDOMINAL LIGATION         Current Outpatient Medications:     Ascorbic Acid (VITAMIN C PO), Take 2,000 Units by mouth Daily., Disp: , Rfl:     carBAMazepine XR (TEGretol-XR) 100 MG 12 hr tablet, Take 1 tablet by mouth 2 (Two) Times a Day., Disp: 60 tablet, Rfl: 3    Cholecalciferol (VITAMIN D-3 PO), Take 4,000 Units by mouth Daily., Disp: , Rfl:     Cosopt PF 2-0.5 % solution, USE 1 DROP INTO EACH EYE EVERY MORNING AND EVERY NIGHT AT BEDTIME, Disp: , Rfl:     doxycycline (VIBRAMYCIN) 100 MG capsule, Take 1 capsule by mouth 2 (Two) Times a Day., Disp: 20 capsule, Rfl: 0    glucose blood test strip, Use as instructed-use one daily Dx e11.9, Disp: 100 each, Rfl: 0    guaiFENesin (MUCINEX) 600 MG 12 hr tablet, Take 2 tablets by mouth 2 (Two) Times a Day., Disp: 30 tablet, Rfl: 0    hydrOXYzine (ATARAX) 25 MG tablet, Take 1 tablet by mouth Every 8 (Eight) Hours As Needed for Itching. Appointment needed for further  "refills., Disp: 30 tablet, Rfl: 0    ibuprofen (ADVIL,MOTRIN) 600 MG tablet, Take 1 tablet by mouth Every 6 (Six) Hours As Needed for Moderate Pain., Disp: 24 tablet, Rfl: 0    Iyuzeh 0.005 % solution, INSTILL 1 DROP INTO BOTH EYES AT NIGHT, Disp: , Rfl:     Lancets (OneTouch Delica Plus [id number]) misc, Inject 1 Lancet under the skin into the appropriate area as directed 3 (Three) Times a Day As Needed (use to check blood glucose)., Disp: 100 each, Rfl: 0    multivitamin with minerals tablet tablet, Take 1 tablet by mouth Daily., Disp: , Rfl:     Omega-3 Fatty Acids (OMEGA-3 FISH OIL PO), Take  by mouth., Disp: , Rfl:     Turmeric (QC Tumeric Complex) 500 MG capsule, Take  by mouth., Disp: , Rfl:   Allergies   Allergen Reactions    Amoxicillin-Pot Clavulanate Itching, Nausea Only, Other (See Comments), Rash and GI Intolerance    Latex Itching    Compazine [Prochlorperazine] Unknown - High Severity    Flonase [Fluticasone] Other (See Comments)     Jittery     Oxycodone-Acetaminophen Nausea Only    Penicillins Unknown - High Severity    Phenergan [Promethazine] Unknown - High Severity    Sulfamethoxazole-Trimethoprim Nausea Only    Zithromax [Azithromycin] Unknown - High Severity       Objective     Vital Signs  /88 (BP Location: Left arm, Patient Position: Sitting, Cuff Size: Adult)   Pulse 102   Temp 98 °F (36.7 °C) (Infrared)   Resp 16   Ht 154.9 cm (60.98\")   Wt 54.4 kg (120 lb)   SpO2 100%   BMI 22.69 kg/m²   Estimated body mass index is 22.69 kg/m² as calculated from the following:    Height as of this encounter: 154.9 cm (60.98\").    Weight as of this encounter: 54.4 kg (120 lb).    BMI is within normal parameters. No other follow-up for BMI required.      Physical Exam    Physical Exam  Constitutional:       Appearance: She is normal weight. She is ill-appearing.   HENT:      Right Ear: Hearing, tympanic membrane, ear canal and external ear normal.      Left Ear: Hearing, tympanic membrane, ear " canal and external ear normal.      Nose: Rhinorrhea present. Rhinorrhea is clear.      Right Sinus: Frontal sinus tenderness present.      Left Sinus: Frontal sinus tenderness present.      Mouth/Throat:      Pharynx: Uvula midline. Posterior oropharyngeal erythema present. No pharyngeal swelling or oropharyngeal exudate.      Tonsils: 0 on the right. 0 on the left.   Cardiovascular:      Rate and Rhythm: Regular rhythm. Tachycardia present.      Pulses: Normal pulses.      Heart sounds: No murmur heard.  Pulmonary:      Effort: Pulmonary effort is normal. No respiratory distress.      Breath sounds: No wheezing.   Abdominal:      Palpations: Abdomen is soft.      Tenderness: There is no abdominal tenderness.   Neurological:      General: No focal deficit present.      Mental Status: She is alert and oriented to person, place, and time. Mental status is at baseline.   Psychiatric:         Mood and Affect: Mood normal.         Behavior: Behavior normal.         Thought Content: Thought content normal.         Judgment: Judgment normal.          Results      Results for orders placed or performed in visit on 08/26/24   POCT SARS-CoV-2 Antigen JOSE M + Flu    Specimen: Swab   Result Value Ref Range    SARS Antigen Not Detected Not Detected, Presumptive Negative    Influenza A Antigen JOSE M Not Detected Not Detected    Influenza B Antigen JOSE M Not Detected Not Detected    Internal Control Passed Passed    Lot Number [id number]     Expiration Date 05-    POCT rapid strep A    Specimen: Swab   Result Value Ref Range    Rapid Strep A Screen Negative Negative, VALID, INVALID, Not Performed    Internal Control Passed Passed    Lot Number [id number]     Expiration Date 07/10/2025               Assessment and Plan     Diagnoses and all orders for this visit:    1. Fever, unspecified fever cause (Primary)  -     POCT SARS-CoV-2 Antigen JOSE M + Flu  -     doxycycline (VIBRAMYCIN) 100 MG capsule; Take 1 capsule by mouth 2 (Two) Times  a Day.  Dispense: 20 capsule; Refill: 0    2. Head congestion  -     POCT SARS-CoV-2 Antigen JOSE M + Flu  -     doxycycline (VIBRAMYCIN) 100 MG capsule; Take 1 capsule by mouth 2 (Two) Times a Day.  Dispense: 20 capsule; Refill: 0    3. Sore throat  -     POCT rapid strep A  -     doxycycline (VIBRAMYCIN) 100 MG capsule; Take 1 capsule by mouth 2 (Two) Times a Day.  Dispense: 20 capsule; Refill: 0    POC flu/COVID and strep negative  Patient's allergy list reviewed, will send doxycycline to pharmacy   Encouraged increased hydration and rest.  Continue warm salt water gargles for sore throat  Tylenol/ibuprofen for body aches and fevers  Assessment & Plan      Follow Up  Return if symptoms worsen or fail to improve.    Jacquelyn Gamino, APRN

## 2024-08-26 NOTE — TELEPHONE ENCOUNTER
PATIENT HAS CALLED REQUESTING A CALL BACK FROM THE NURSE OR PCP WITH SUGGESTIONS OF DECONGESTANT AND COUGH THAT WOULD BE OK TO TAKE WITH PRESCRIBED MEDICATION.     CALL BACK NUMBER [phone]

## 2024-09-27 ENCOUNTER — LAB (OUTPATIENT)
Age: 64
End: 2024-09-27
Payer: MEDICAID

## 2024-09-27 ENCOUNTER — OFFICE VISIT (OUTPATIENT)
Dept: NEUROLOGY | Facility: CLINIC | Age: 64
End: 2024-09-27
Payer: MEDICAID

## 2024-09-27 VITALS
SYSTOLIC BLOOD PRESSURE: 140 MMHG | WEIGHT: 120 LBS | OXYGEN SATURATION: 98 % | DIASTOLIC BLOOD PRESSURE: 80 MMHG | HEIGHT: 61 IN | BODY MASS INDEX: 22.66 KG/M2 | HEART RATE: 78 BPM

## 2024-09-27 DIAGNOSIS — M79.2 NEURALGIA: ICD-10-CM

## 2024-09-27 DIAGNOSIS — G44.85 STABBING HEADACHE: ICD-10-CM

## 2024-09-27 DIAGNOSIS — M79.2 NEURALGIA: Primary | ICD-10-CM

## 2024-09-27 PROCEDURE — 80048 BASIC METABOLIC PNL TOTAL CA: CPT | Performed by: NURSE PRACTITIONER

## 2024-09-27 PROCEDURE — 85652 RBC SED RATE AUTOMATED: CPT | Performed by: NURSE PRACTITIONER

## 2024-09-27 PROCEDURE — 1159F MED LIST DOCD IN RCRD: CPT | Performed by: NURSE PRACTITIONER

## 2024-09-27 PROCEDURE — 1160F RVW MEDS BY RX/DR IN RCRD: CPT | Performed by: NURSE PRACTITIONER

## 2024-09-27 PROCEDURE — 99214 OFFICE O/P EST MOD 30 MIN: CPT | Performed by: NURSE PRACTITIONER

## 2024-09-27 PROCEDURE — 86140 C-REACTIVE PROTEIN: CPT | Performed by: NURSE PRACTITIONER

## 2024-09-27 RX ORDER — CLINDAMYCIN HCL 300 MG
300 CAPSULE ORAL 4 TIMES DAILY
COMMUNITY
Start: 2024-09-25

## 2024-09-28 LAB
ANION GAP SERPL CALCULATED.3IONS-SCNC: 10 MMOL/L (ref 5–15)
BUN SERPL-MCNC: 12 MG/DL (ref 8–23)
BUN/CREAT SERPL: 16.9 (ref 7–25)
CALCIUM SPEC-SCNC: 10 MG/DL (ref 8.6–10.5)
CHLORIDE SERPL-SCNC: 102 MMOL/L (ref 98–107)
CO2 SERPL-SCNC: 27 MMOL/L (ref 22–29)
CREAT SERPL-MCNC: 0.71 MG/DL (ref 0.57–1)
CRP SERPL-MCNC: <0.3 MG/DL (ref 0–0.5)
EGFRCR SERPLBLD CKD-EPI 2021: 95.1 ML/MIN/1.73
ERYTHROCYTE [SEDIMENTATION RATE] IN BLOOD: 7 MM/HR (ref 0–30)
GLUCOSE SERPL-MCNC: 93 MG/DL (ref 65–99)
POTASSIUM SERPL-SCNC: 4.6 MMOL/L (ref 3.5–5.2)
SODIUM SERPL-SCNC: 139 MMOL/L (ref 136–145)

## 2024-09-28 NOTE — PROGRESS NOTES
Please notify Melly that all of her labs from her visit with us are normal. We will await her MRI/MRA to be scheduled and notify her of those results. Thanks, RENETTA Marin PCP

## 2024-09-30 ENCOUNTER — TELEPHONE (OUTPATIENT)
Dept: NEUROLOGY | Facility: CLINIC | Age: 64
End: 2024-09-30
Payer: MEDICAID

## 2024-09-30 NOTE — TELEPHONE ENCOUNTER
----- Message from Odalis Kyle sent at 9/28/2024  8:53 AM EDT -----  Please notify Melly that all of her labs from her visit with us are normal. We will await her MRI/MRA to be scheduled and notify her of those results. Thanks, RENETTA Marin PCP

## 2024-10-04 ENCOUNTER — PATIENT ROUNDING (BHMG ONLY) (OUTPATIENT)
Dept: NEUROLOGY | Facility: CLINIC | Age: 64
End: 2024-10-04
Payer: MEDICAID

## 2024-10-28 ENCOUNTER — OFFICE VISIT (OUTPATIENT)
Dept: INTERNAL MEDICINE | Facility: CLINIC | Age: 64
End: 2024-10-28
Payer: MEDICAID

## 2024-10-28 VITALS
BODY MASS INDEX: 22.66 KG/M2 | SYSTOLIC BLOOD PRESSURE: 124 MMHG | OXYGEN SATURATION: 95 % | TEMPERATURE: 97.5 F | HEART RATE: 84 BPM | HEIGHT: 61 IN | WEIGHT: 120 LBS | RESPIRATION RATE: 16 BRPM | DIASTOLIC BLOOD PRESSURE: 86 MMHG

## 2024-10-28 DIAGNOSIS — R09.81 HEAD CONGESTION: Primary | ICD-10-CM

## 2024-10-28 DIAGNOSIS — J01.10 ACUTE FRONTAL SINUSITIS, RECURRENCE NOT SPECIFIED: ICD-10-CM

## 2024-10-28 DIAGNOSIS — J02.9 SORE THROAT: ICD-10-CM

## 2024-10-28 DIAGNOSIS — R05.1 ACUTE COUGH: ICD-10-CM

## 2024-10-28 PROCEDURE — 99214 OFFICE O/P EST MOD 30 MIN: CPT

## 2024-10-28 PROCEDURE — 87880 STREP A ASSAY W/OPTIC: CPT

## 2024-10-28 PROCEDURE — 1159F MED LIST DOCD IN RCRD: CPT

## 2024-10-28 PROCEDURE — 1126F AMNT PAIN NOTED NONE PRSNT: CPT

## 2024-10-28 PROCEDURE — 87428 SARSCOV & INF VIR A&B AG IA: CPT

## 2024-10-28 PROCEDURE — 1160F RVW MEDS BY RX/DR IN RCRD: CPT

## 2024-10-28 RX ORDER — DOXYCYCLINE 100 MG/1
100 CAPSULE ORAL 2 TIMES DAILY
Qty: 20 CAPSULE | Refills: 0 | Status: SHIPPED | OUTPATIENT
Start: 2024-10-28

## 2024-10-28 RX ORDER — BROMPHENIRAMINE MALEATE, PSEUDOEPHEDRINE HYDROCHLORIDE, AND DEXTROMETHORPHAN HYDROBROMIDE 2; 30; 10 MG/5ML; MG/5ML; MG/5ML
5 SYRUP ORAL 4 TIMES DAILY PRN
Qty: 120 ML | Refills: 0 | Status: SHIPPED | OUTPATIENT
Start: 2024-10-28 | End: 2024-11-03

## 2024-10-28 NOTE — PROGRESS NOTES
Office Note     Name: Melly Cantor    : 1960     MRN: [id number]     Chief Complaint  Nasal Congestion (Grandson was sick with virus /Going on for 7 days ), Sore Throat, Cough (Dry cough /), and Chills    Subjective     History of Present Illness:  History of Present Illness      Melly Cantor is a 64 y.o. female who presents today for sore throat, sinus pressure/congestion, dry cough and fever/chills. Symptoms first started about 7 days ago. She states her grandson has also been sick for the past week, he tested negative for flu/covid.     She has been taking tylenol cold/flu which has not helped her symptoms. States her nose is so congested she has a hard time breathing.    Past Medical History:   Diagnosis Date    Cataract     Hyperlipidemia     Prediabetes     Trigeminal neuralgia      Past Surgical History:   Procedure Laterality Date     SECTION      KNEE ARTHROSCOPY      TONSILLECTOMY      TUBAL ABDOMINAL LIGATION         Current Outpatient Medications:     Ascorbic Acid (VITAMIN C PO), Take 2,000 Units by mouth Daily., Disp: , Rfl:     carBAMazepine XR (TEGretol-XR) 100 MG 12 hr tablet, Take 1 tablet by mouth 2 (Two) Times a Day., Disp: 60 tablet, Rfl: 3    Cholecalciferol (VITAMIN D-3 PO), Take 4,000 Units by mouth Daily., Disp: , Rfl:     clindamycin (CLEOCIN) 300 MG capsule, Take 1 capsule by mouth 4 (Four) Times a Day., Disp: , Rfl:     Cosopt PF 2-0.5 % solution, USE 1 DROP INTO EACH EYE EVERY MORNING AND EVERY NIGHT AT BEDTIME, Disp: , Rfl:     glucose blood test strip, Use as instructed-use one daily Dx e11.9, Disp: 100 each, Rfl: 0    hydrOXYzine (ATARAX) 25 MG tablet, Take 1 tablet by mouth Every 8 (Eight) Hours As Needed for Itching. Appointment needed for further refills., Disp: 30 tablet, Rfl: 0    ibuprofen (ADVIL,MOTRIN) 600 MG tablet, Take 1 tablet by mouth Every 6 (Six) Hours As Needed for Moderate Pain., Disp: 24 tablet, Rfl: 0    Iyuzeh 0.005 % solution,  "INSTILL 1 DROP INTO BOTH EYES AT NIGHT, Disp: , Rfl:     Lancets (OneTouch Delica Plus [id number]) misc, Inject 1 Lancet under the skin into the appropriate area as directed 3 (Three) Times a Day As Needed (use to check blood glucose)., Disp: 100 each, Rfl: 0    multivitamin with minerals tablet tablet, Take 1 tablet by mouth Daily., Disp: , Rfl:     Omega-3 Fatty Acids (OMEGA-3 FISH OIL PO), Take  by mouth., Disp: , Rfl:     Turmeric (QC Tumeric Complex) 500 MG capsule, Take  by mouth., Disp: , Rfl:     brompheniramine-pseudoephedrine-DM 30-2-10 MG/5ML syrup, Take 5 mL by mouth 4 (Four) Times a Day As Needed for Congestion or Cough for up to 6 days., Disp: 120 mL, Rfl: 0    doxycycline (VIBRAMYCIN) 100 MG capsule, Take 1 capsule by mouth 2 (Two) Times a Day., Disp: 20 capsule, Rfl: 0  Allergies   Allergen Reactions    Amoxicillin-Pot Clavulanate Itching, Nausea Only, Other (See Comments), Rash and GI Intolerance    Latex Itching    Compazine [Prochlorperazine] Unknown - High Severity    Flonase [Fluticasone] Other (See Comments)     Jittery     Oxycodone-Acetaminophen Nausea Only    Penicillins Unknown - High Severity    Phenergan [Promethazine] Unknown - High Severity    Sulfamethoxazole-Trimethoprim Nausea Only    Zithromax [Azithromycin] Unknown - High Severity       Objective     Vital Signs  /86 (BP Location: Right arm, Patient Position: Sitting, Cuff Size: Adult)   Pulse 84   Temp 97.5 °F (36.4 °C) (Infrared)   Resp 16   Ht 154.9 cm (60.98\")   Wt 54.4 kg (120 lb)   SpO2 95%   BMI 22.69 kg/m²   Estimated body mass index is 22.69 kg/m² as calculated from the following:    Height as of this encounter: 154.9 cm (60.98\").    Weight as of this encounter: 54.4 kg (120 lb).    BMI is within normal parameters. No other follow-up for BMI required.      Physical Exam    Physical Exam  Constitutional:       Appearance: She is ill-appearing.   HENT:      Nose: Congestion present.      Right Sinus: Frontal " sinus tenderness present.      Left Sinus: Frontal sinus tenderness present.      Mouth/Throat:      Mouth: Mucous membranes are moist.      Pharynx: Oropharynx is clear.   Eyes:      Extraocular Movements: Extraocular movements intact.      Conjunctiva/sclera: Conjunctivae normal.      Pupils: Pupils are equal, round, and reactive to light.   Cardiovascular:      Pulses: Normal pulses.      Heart sounds: Normal heart sounds.   Pulmonary:      Effort: Pulmonary effort is normal. No respiratory distress.      Breath sounds: No wheezing or rhonchi.   Neurological:      General: No focal deficit present.      Mental Status: She is alert and oriented to person, place, and time. Mental status is at baseline.   Psychiatric:         Mood and Affect: Mood normal.         Behavior: Behavior normal.         Thought Content: Thought content normal.         Judgment: Judgment normal.          Results      Results for orders placed or performed in visit on 10/28/24   POCT rapid strep A    Collection Time: 10/28/24  4:29 PM    Specimen: Swab   Result Value Ref Range    Rapid Strep A Screen Negative Negative, VALID, INVALID, Not Performed    Internal Control Passed Passed    Lot Number [id number]     Expiration Date 07-    POCT SARS-CoV-2 Antigen JOSE M + Flu    Collection Time: 10/28/24  4:36 PM    Specimen: Swab   Result Value Ref Range    SARS Antigen Not Detected Not Detected, Presumptive Negative    Influenza A Antigen JOSE M Not Detected Not Detected    Influenza B Antigen JOSE M Not Detected Not Detected    Internal Control Passed Passed    Lot Number [id number]     Expiration Date 10-               Assessment and Plan     Diagnoses and all orders for this visit:    1. Head congestion (Primary)  -     POCT SARS-CoV-2 Antigen JOSE M + Flu  -     POCT rapid strep A    2. Sore throat  -     POCT rapid strep A    3. Acute frontal sinusitis, recurrence not specified  -     doxycycline (VIBRAMYCIN) 100 MG capsule; Take 1 capsule by  mouth 2 (Two) Times a Day.  Dispense: 20 capsule; Refill: 0    4. Acute cough  -     brompheniramine-pseudoephedrine-DM 30-2-10 MG/5ML syrup; Take 5 mL by mouth 4 (Four) Times a Day As Needed for Congestion or Cough for up to 6 days.  Dispense: 120 mL; Refill: 0    POC flu/covid and strep negative  Doxycycline as directed  Bromfed for cough  Pt can not take decongestants so encouraged her to use nasal saline spray/rinses to help with nasal congestion  Encouraged humidified air as well to help with congestion   Assessment & Plan      If a referral was made please contact our office if you have not heard about an appointment in the next 2 weeks.   If labs or images are ordered we will contact you with the results within the next week.  If you have not heard from us after a week please call our office to inquire about the results.    Follow Up  Return if symptoms worsen or fail to improve.    Jacquelyn Gamino, APRN

## 2024-10-30 ENCOUNTER — TELEPHONE (OUTPATIENT)
Dept: GASTROENTEROLOGY | Facility: CLINIC | Age: 64
End: 2024-10-30
Payer: MEDICAID

## 2024-10-30 RX ORDER — SODIUM, POTASSIUM,MAG SULFATES 17.5-3.13G
1 SOLUTION, RECONSTITUTED, ORAL ORAL TAKE AS DIRECTED
Qty: 354 ML | Refills: 0 | Status: SHIPPED | OUTPATIENT
Start: 2024-10-30

## 2024-10-30 NOTE — TELEPHONE ENCOUNTER
Provider: MICHELL HADDAD    Caller: BALA DELACRUZ    Relationship to Patient: SELF    Phone Number: [phone]    Reason for Call: PATIENT CALLED IN AND STATED THAT SHE NEEDS TO RESCHEDULE HER UPCOMING PROCEDURE SCHEDULED FOR 11/11/2024. PATIENT STATED THAT HER RIDE WILL NOT BE AVAILABLE. PATIENT STATED THAT SHE WOULD LIKE TO RESCHEDULE FOR WEDNESDAY DECEMBER 18TH IF POSSIBLE,. PLEASE CALL BACK ANYTIME, OKAY TO LEAVE VM.

## 2024-10-31 ENCOUNTER — PRIOR AUTHORIZATION (OUTPATIENT)
Dept: GASTROENTEROLOGY | Facility: CLINIC | Age: 64
End: 2024-10-31
Payer: MEDICAID

## 2024-10-31 NOTE — TELEPHONE ENCOUNTER
Melly Cantor (Key: [id number])  PA Case ID #: [id number]  Rx #: [id number]  Need Help? Call us at [phone]  Status  sent iconSent to Plan today  Drug  Na Sulfate-K Sulfate-Mg Sulf 17.5-3.13-1.6GM/177ML solution  ePA cloud logo  Form  [employer] [place] [employer] ePA Form 2017 NCPDP

## 2024-11-15 ENCOUNTER — TELEPHONE (OUTPATIENT)
Dept: INTERNAL MEDICINE | Facility: CLINIC | Age: 64
End: 2024-11-15

## 2024-11-15 NOTE — TELEPHONE ENCOUNTER
Caller: Melly Cantor    Relationship to patient: Self    Best call back number:     [phone] (Home)     Date of possible COVID19 exposure:     YESTERDAY, 11/14 - [facility]     COVID19 symptoms:     PATIENT DOESN'T HAVE SYMPTOMS CURRENTLY - TESTED COVID NEGATIVE     PATIENT REQUESTED A CALL BACK TO SCHEDULE IN OFFICE COVID TEST

## 2024-12-10 ENCOUNTER — TELEPHONE (OUTPATIENT)
Dept: INTERNAL MEDICINE | Facility: CLINIC | Age: 64
End: 2024-12-10
Payer: MEDICAID

## 2024-12-10 DIAGNOSIS — Z12.11 ENCOUNTER FOR SCREENING COLONOSCOPY: Primary | ICD-10-CM

## 2024-12-10 NOTE — TELEPHONE ENCOUNTER
Caller: Melly Cantor    Relationship: Self    Best call back number: [phone]    What is the best time to reach you: ANYTIME     Who are you requesting to speak with (clinical staff, provider,  specific staff member): CLINICAL STAFF      What was the call regarding: THE PATIENT STATES THAT SHE NEEDS TO KNOW WHO SHE WAS SENT TO TO HAVE HER COLONOSCOPY IT WAS NOT AT THE HOSPITAL IT WAS GROUP THAT ONLY DO COLONOSCOPIES

## 2024-12-10 NOTE — TELEPHONE ENCOUNTER
Caller: Melly Cantor    Relationship: Self    Best call back number: [phone]     What is the medical concern/diagnosis: COLONOSCOPY    What is the provider, practice or medical service name: [facility]  DR MEEKS    What is the office location: [address] [place] [address] DR MARCUS    What is the office phone number: [phone]

## 2024-12-13 ENCOUNTER — TELEPHONE (OUTPATIENT)
Dept: NEUROLOGY | Facility: CLINIC | Age: 64
End: 2024-12-13
Payer: MEDICAID

## 2024-12-13 NOTE — TELEPHONE ENCOUNTER
Provider: REJI    Caller: Melly Cantor    Relationship to Patient: Self    Phone Number: [phone]    Reason for Call: PATIENT IS REQUESTING THE MRI ORDERS TO BE PUT IN AGAIN SO SHE CAN RESCHEDULE. PATIENT IS REQUESTING A CALL BACK TO CONFIRM WHERE MRI WILL BE SENT.

## 2024-12-16 NOTE — TELEPHONE ENCOUNTER
Rx Refill Note  Requested Prescriptions      No prescriptions requested or ordered in this encounter      Last office visit with prescribing clinician: 9/27/2024   Last telemedicine visit with prescribing clinician: Visit date not found   Next office visit with prescribing clinician: 5/30/2025                         Would you like a call back once the refill request has been completed: [] Yes [] No    If the office needs to give you a call back, can they leave a voicemail: [] Yes [] No    Machelle Ga CMA  12/16/24, 14:22 EST

## 2024-12-16 NOTE — TELEPHONE ENCOUNTER
MRI and MRA head would be sent to [facility]. Please confirm she is ok with this and I will reorder. She canceled twice in October so just want to ensure she is good with doing those scans with [facility] before reordering. Thanks.

## 2024-12-18 ENCOUNTER — OFFICE VISIT (OUTPATIENT)
Dept: INTERNAL MEDICINE | Facility: CLINIC | Age: 64
End: 2024-12-18
Payer: MEDICAID

## 2024-12-18 VITALS
HEIGHT: 61 IN | BODY MASS INDEX: 22.66 KG/M2 | SYSTOLIC BLOOD PRESSURE: 144 MMHG | TEMPERATURE: 97.7 F | WEIGHT: 120 LBS | RESPIRATION RATE: 18 BRPM | HEART RATE: 82 BPM | OXYGEN SATURATION: 98 % | DIASTOLIC BLOOD PRESSURE: 76 MMHG

## 2024-12-18 DIAGNOSIS — R05.1 ACUTE COUGH: ICD-10-CM

## 2024-12-18 DIAGNOSIS — J06.9 ACUTE URI: Primary | ICD-10-CM

## 2024-12-18 DIAGNOSIS — J02.9 SORE THROAT: ICD-10-CM

## 2024-12-18 PROCEDURE — 1126F AMNT PAIN NOTED NONE PRSNT: CPT

## 2024-12-18 PROCEDURE — 1160F RVW MEDS BY RX/DR IN RCRD: CPT

## 2024-12-18 PROCEDURE — 87880 STREP A ASSAY W/OPTIC: CPT

## 2024-12-18 PROCEDURE — 1159F MED LIST DOCD IN RCRD: CPT

## 2024-12-18 PROCEDURE — 87428 SARSCOV & INF VIR A&B AG IA: CPT

## 2024-12-18 PROCEDURE — 99214 OFFICE O/P EST MOD 30 MIN: CPT

## 2024-12-18 RX ORDER — DOXYCYCLINE 100 MG/1
100 CAPSULE ORAL 2 TIMES DAILY
Qty: 14 CAPSULE | Refills: 0 | Status: SHIPPED | OUTPATIENT
Start: 2024-12-18

## 2024-12-18 NOTE — PROGRESS NOTES
Office Note     Name: Melly Cantor    : 1960     MRN: [id number]     Chief Complaint  Sore Throat (Grandson had RSV ), Cough (Started Saturday ), Nasal Congestion, Chills, and Headache    Subjective     History of Present Illness:  History of Present Illness      Melly Cantor is a 64 y.o. female who presents today for cough, congestion, sore throat and headache that started on Saturday. Denies any SOB or difficulty breathing. She denies any measured fevers but has had chills. She has also started losing he voice and has a lot of phlegm. She has been taking dayquil and nyquil. She is intolerant to mucinex and flonase as it makes her heart race and feel very jittery.    Jacki did have RSV last week.       Past Medical History:   Diagnosis Date    Cataract     Hyperlipidemia     Prediabetes     Trigeminal neuralgia      Past Surgical History:   Procedure Laterality Date     SECTION      KNEE ARTHROSCOPY      TONSILLECTOMY      TUBAL ABDOMINAL LIGATION         Current Outpatient Medications:     Ascorbic Acid (VITAMIN C PO), Take 2,000 Units by mouth Daily., Disp: , Rfl:     carBAMazepine XR (TEGretol-XR) 100 MG 12 hr tablet, Take 1 tablet by mouth 2 (Two) Times a Day., Disp: 60 tablet, Rfl: 3    Cholecalciferol (VITAMIN D-3 PO), Take 4,000 Units by mouth Daily., Disp: , Rfl:     clindamycin (CLEOCIN) 300 MG capsule, Take 1 capsule by mouth 4 (Four) Times a Day., Disp: , Rfl:     Cosopt PF 2-0.5 % solution, USE 1 DROP INTO EACH EYE EVERY MORNING AND EVERY NIGHT AT BEDTIME, Disp: , Rfl:     doxycycline (VIBRAMYCIN) 100 MG capsule, Take 1 capsule by mouth 2 (Two) Times a Day., Disp: 14 capsule, Rfl: 0    glucose blood test strip, Use as instructed-use one daily Dx e11.9, Disp: 100 each, Rfl: 0    hydrOXYzine (ATARAX) 25 MG tablet, Take 1 tablet by mouth Every 8 (Eight) Hours As Needed for Itching. Appointment needed for further refills., Disp: 30 tablet, Rfl: 0    ibuprofen  "(ADVIL,MOTRIN) 600 MG tablet, Take 1 tablet by mouth Every 6 (Six) Hours As Needed for Moderate Pain., Disp: 24 tablet, Rfl: 0    Iyuzeh 0.005 % solution, INSTILL 1 DROP INTO BOTH EYES AT NIGHT, Disp: , Rfl:     Lancets (OneTouch Delica Plus [id number]) misc, Inject 1 Lancet under the skin into the appropriate area as directed 3 (Three) Times a Day As Needed (use to check blood glucose)., Disp: 100 each, Rfl: 0    multivitamin with minerals tablet tablet, Take 1 tablet by mouth Daily., Disp: , Rfl:     Omega-3 Fatty Acids (OMEGA-3 FISH OIL PO), Take  by mouth., Disp: , Rfl:     sodium-potassium-magnesium sulfates (Suprep Bowel Prep Kit) 17.5-3.13-1.6 GM/177ML solution oral solution, Take 1 bottle by mouth Take As Directed. Follow instructions that were mailed to your home. If you didn't receive these call [phone]., Disp: 354 mL, Rfl: 0    Turmeric (QC Tumeric Complex) 500 MG capsule, Take  by mouth., Disp: , Rfl:   Allergies   Allergen Reactions    Amoxicillin-Pot Clavulanate Itching, Nausea Only, Other (See Comments), Rash and GI Intolerance    Latex Itching    Compazine [Prochlorperazine] Unknown - High Severity    Flonase [Fluticasone] Other (See Comments)     Jittery     Oxycodone-Acetaminophen Nausea Only    Penicillins Unknown - High Severity    Phenergan [Promethazine] Unknown - High Severity    Sulfamethoxazole-Trimethoprim Nausea Only    Zithromax [Azithromycin] Unknown - High Severity       Objective     Vital Signs  /76 (BP Location: Right arm, Patient Position: Sitting, Cuff Size: Adult)   Pulse 82   Temp 97.7 °F (36.5 °C) (Infrared)   Resp 18   Ht 154.9 cm (60.98\")   Wt 54.4 kg (120 lb)   SpO2 98%   BMI 22.69 kg/m²   Estimated body mass index is 22.69 kg/m² as calculated from the following:    Height as of this encounter: 154.9 cm (60.98\").    Weight as of this encounter: 54.4 kg (120 lb).    BMI is within normal parameters. No other follow-up for BMI required.      Physical " Exam    Physical Exam  Constitutional:       Appearance: She is ill-appearing.   HENT:      Right Ear: Hearing, tympanic membrane, ear canal and external ear normal.      Left Ear: Hearing, tympanic membrane, ear canal and external ear normal.      Nose: Congestion present.      Mouth/Throat:      Pharynx: Postnasal drip present. No pharyngeal swelling, oropharyngeal exudate or posterior oropharyngeal erythema.      Tonsils: No tonsillar exudate. 0 on the right. 0 on the left.   Eyes:      Extraocular Movements: Extraocular movements intact.      Conjunctiva/sclera: Conjunctivae normal.      Pupils: Pupils are equal, round, and reactive to light.   Cardiovascular:      Rate and Rhythm: Normal rate.      Pulses: Normal pulses.      Heart sounds: Normal heart sounds.   Pulmonary:      Effort: Pulmonary effort is normal. No respiratory distress.      Breath sounds: Normal breath sounds. No wheezing or rhonchi.   Neurological:      General: No focal deficit present.      Mental Status: She is alert and oriented to person, place, and time. Mental status is at baseline.   Psychiatric:         Mood and Affect: Mood normal.         Behavior: Behavior normal.         Thought Content: Thought content normal.         Judgment: Judgment normal.          Results      Results for orders placed or performed in visit on 12/18/24   POCT rapid strep A    Collection Time: 12/18/24 11:53 AM    Specimen: Swab   Result Value Ref Range    Rapid Strep A Screen Negative Negative, VALID, INVALID, Not Performed    Internal Control Passed Passed    Lot Number [id number]     Expiration Date 11/23/2025    POCT SARS-CoV-2 Antigen JOSE M + Flu    Collection Time: 12/18/24 12:20 PM    Specimen: Swab   Result Value Ref Range    SARS Antigen Not Detected Not Detected, Presumptive Negative    Influenza A Antigen JOSE M Not Detected Not Detected    Influenza B Antigen JOSE M Not Detected Not Detected    Internal Control Passed Passed    Lot Number [id number]      Expiration Date 10-               Assessment and Plan     Diagnoses and all orders for this visit:    1. Acute URI (Primary)  -     doxycycline (VIBRAMYCIN) 100 MG capsule; Take 1 capsule by mouth 2 (Two) Times a Day.  Dispense: 14 capsule; Refill: 0    2. Acute cough  -     POCT SARS-CoV-2 Antigen JOSE M + Flu  -     POCT rapid strep A    3. Sore throat  -     POCT rapid strep A    POC flu, covid and strep negative  Doxy as directed  Rest/hydrate    Assessment & Plan       If labs or images are ordered we will contact you with the results within the next week.  If you have not heard from us after a week please call our office to inquire about the results.    Follow Up  Return if symptoms worsen or fail to improve.    Jacquelyn Gaimno, APRN

## 2024-12-24 DIAGNOSIS — L50.9 HIVES: ICD-10-CM

## 2024-12-26 RX ORDER — HYDROXYZINE HYDROCHLORIDE 25 MG/1
TABLET, FILM COATED ORAL
Qty: 30 TABLET | Refills: 0 | Status: SHIPPED | OUTPATIENT
Start: 2024-12-26

## 2024-12-26 NOTE — TELEPHONE ENCOUNTER
Rx Refill Note  Requested Prescriptions     Pending Prescriptions Disp Refills    hydrOXYzine (ATARAX) 25 MG tablet [Pharmacy Med Name: HYDROXYZINE HCL 25MG TABS (WHITE)] 30 tablet 0     Sig: TAKE 1 TABLET BY MOUTH EVERY 8 HOURS AS NEEDED FOR ITCHING. MUST BE SEEN FOR REFILLS      Last office visit with prescribing clinician: 12/18/2024   Last telemedicine visit with prescribing clinician: Visit date not found   Next office visit with prescribing clinician: Visit date not found       Shonda Causey MA  12/26/24, 10:06 EST

## 2025-01-13 ENCOUNTER — TRANSCRIBE ORDERS (OUTPATIENT)
Dept: ADMINISTRATIVE | Facility: HOSPITAL | Age: 65
End: 2025-01-13
Payer: MEDICAID

## 2025-01-13 DIAGNOSIS — Z12.31 VISIT FOR SCREENING MAMMOGRAM: Primary | ICD-10-CM

## 2025-02-03 ENCOUNTER — HOSPITAL ENCOUNTER (OUTPATIENT)
Dept: MRI IMAGING | Facility: HOSPITAL | Age: 65
Discharge: HOME | End: 2025-02-03
Payer: MEDICAID

## 2025-02-03 DIAGNOSIS — M79.2 NEURALGIA: ICD-10-CM

## 2025-02-03 DIAGNOSIS — G44.85 STABBING HEADACHE: ICD-10-CM

## 2025-02-03 PROCEDURE — A9577 INJ MULTIHANCE: HCPCS | Performed by: NURSE PRACTITIONER

## 2025-02-03 PROCEDURE — 70544 MR ANGIOGRAPHY HEAD W/O DYE: CPT

## 2025-02-03 PROCEDURE — 70553 MRI BRAIN STEM W/O & W/DYE: CPT

## 2025-02-03 PROCEDURE — 25510000002 GADOBENATE DIMEGLUMINE 529 MG/ML SOLUTION: Performed by: NURSE PRACTITIONER

## 2025-02-03 RX ADMIN — GADOBENATE DIMEGLUMINE 10 ML: 529 INJECTION, SOLUTION INTRAVENOUS at 14:39

## 2025-02-04 ENCOUNTER — OFFICE VISIT (OUTPATIENT)
Dept: INTERNAL MEDICINE | Facility: CLINIC | Age: 65
End: 2025-02-04
Payer: MEDICAID

## 2025-02-04 ENCOUNTER — LAB (OUTPATIENT)
Dept: INTERNAL MEDICINE | Facility: CLINIC | Age: 65
End: 2025-02-04
Payer: MEDICAID

## 2025-02-04 VITALS
SYSTOLIC BLOOD PRESSURE: 110 MMHG | HEIGHT: 61 IN | BODY MASS INDEX: 22.88 KG/M2 | TEMPERATURE: 97.3 F | DIASTOLIC BLOOD PRESSURE: 68 MMHG | WEIGHT: 121.2 LBS | HEART RATE: 66 BPM | RESPIRATION RATE: 16 BRPM | OXYGEN SATURATION: 99 %

## 2025-02-04 DIAGNOSIS — Z13.820 ENCOUNTER FOR OSTEOPOROSIS SCREENING IN ASYMPTOMATIC POSTMENOPAUSAL PATIENT: ICD-10-CM

## 2025-02-04 DIAGNOSIS — Z00.00 ENCOUNTER FOR ANNUAL PHYSICAL EXAM: ICD-10-CM

## 2025-02-04 DIAGNOSIS — L50.9 HIVES: ICD-10-CM

## 2025-02-04 DIAGNOSIS — G50.0 TRIGEMINAL NEURALGIA: ICD-10-CM

## 2025-02-04 DIAGNOSIS — E78.2 MIXED HYPERLIPIDEMIA: ICD-10-CM

## 2025-02-04 DIAGNOSIS — E55.9 VITAMIN D DEFICIENCY: ICD-10-CM

## 2025-02-04 DIAGNOSIS — L82.1 SEBORRHEIC KERATOSIS: ICD-10-CM

## 2025-02-04 DIAGNOSIS — R73.03 PREDIABETES: ICD-10-CM

## 2025-02-04 DIAGNOSIS — Z78.0 ENCOUNTER FOR OSTEOPOROSIS SCREENING IN ASYMPTOMATIC POSTMENOPAUSAL PATIENT: ICD-10-CM

## 2025-02-04 DIAGNOSIS — Z00.00 ENCOUNTER FOR ANNUAL PHYSICAL EXAM: Primary | ICD-10-CM

## 2025-02-04 LAB
DEPRECATED RDW RBC AUTO: 40.2 FL (ref 37–54)
ERYTHROCYTE [DISTWIDTH] IN BLOOD BY AUTOMATED COUNT: 12.7 % (ref 12.3–15.4)
HBA1C MFR BLD: 6 % (ref 4.8–5.6)
HCT VFR BLD AUTO: 41.1 % (ref 34–46.6)
HGB BLD-MCNC: 13.6 G/DL (ref 12–15.9)
MCH RBC QN AUTO: 29.2 PG (ref 26.6–33)
MCHC RBC AUTO-ENTMCNC: 33.1 G/DL (ref 31.5–35.7)
MCV RBC AUTO: 88.2 FL (ref 79–97)
PLATELET # BLD AUTO: 236 10*3/MM3 (ref 140–450)
PMV BLD AUTO: 10.6 FL (ref 6–12)
RBC # BLD AUTO: 4.66 10*6/MM3 (ref 3.77–5.28)
WBC NRBC COR # BLD AUTO: 6.17 10*3/MM3 (ref 3.4–10.8)

## 2025-02-04 PROCEDURE — 82306 VITAMIN D 25 HYDROXY: CPT

## 2025-02-04 PROCEDURE — 80061 LIPID PANEL: CPT

## 2025-02-04 PROCEDURE — 1159F MED LIST DOCD IN RCRD: CPT

## 2025-02-04 PROCEDURE — 85027 COMPLETE CBC AUTOMATED: CPT

## 2025-02-04 PROCEDURE — 82607 VITAMIN B-12: CPT

## 2025-02-04 PROCEDURE — 1126F AMNT PAIN NOTED NONE PRSNT: CPT

## 2025-02-04 PROCEDURE — 83036 HEMOGLOBIN GLYCOSYLATED A1C: CPT

## 2025-02-04 PROCEDURE — 99396 PREV VISIT EST AGE 40-64: CPT

## 2025-02-04 PROCEDURE — 84443 ASSAY THYROID STIM HORMONE: CPT

## 2025-02-04 PROCEDURE — 80053 COMPREHEN METABOLIC PANEL: CPT

## 2025-02-04 PROCEDURE — 1160F RVW MEDS BY RX/DR IN RCRD: CPT

## 2025-02-04 PROCEDURE — 36415 COLL VENOUS BLD VENIPUNCTURE: CPT

## 2025-02-04 RX ORDER — HYDROXYZINE HYDROCHLORIDE 25 MG/1
25 TABLET, FILM COATED ORAL EVERY 8 HOURS PRN
Qty: 30 TABLET | Refills: 0 | Status: SHIPPED | OUTPATIENT
Start: 2025-02-04

## 2025-02-04 RX ORDER — CARBAMAZEPINE 100 MG/1
100 TABLET, EXTENDED RELEASE ORAL 2 TIMES DAILY
Qty: 60 TABLET | Refills: 3 | Status: SHIPPED | OUTPATIENT
Start: 2025-02-04

## 2025-02-04 NOTE — PROGRESS NOTES
Annual Physical Exam     Name: Melly Cantor     : 1960     MRN: [id number]    Chief Complaint  Annual Exam    Subjective     Subjective          History of Present Illness    eMlly Cantor presents to [facility] for a routine physical.  History of Present Illness     Here for annual exam.     Trigeminal neuralgia-chronic, sees neurology for this. Takes carbamazepine 100 mg bid for this.     She does have some skin growths to her face that are flesh colored. Requesting a referral to dermatology    Diet/exercise: diet is pretty good, she has been walking 4 miles/day 3x/weeks.   Eye exams: sees eye dr at least every 6 months for vision screenings  Dental exams: sees a dentist every 6 months for routine cleanings  Sleep: sleep is good, denies any issues with falling/staying asleep. Avg 7 hrs/night. Feels rested   Mood: mood is stable, denies any issues with depression/anxiety       PHQ-9 Depression Screening  PHQ-2/PHQ-9: Depression Screening  Little interest or pleasure in doing things: Not at all  Feeling down, depressed, or hopeless: Not at all  Patient Health Questionnaire-2 Score: 0      WENDIE-7 Screening  Over the last two weeks, how often have you been bothered by the following problems?  Feeling nervous, anxious or on edge: Not at all  Not being able to stop or control worrying: Not at all  Worrying too much about different things: Not at all  Trouble Relaxing: Not at all  Being so restless that it is hard to sit still: Not at all  Becoming easily annoyed or irritable: Not at all  Feeling afraid as if something awful might happen: Not at all  WENDIE 7 Total Score: 0  If you checked any problems, how difficult have these problems made it for you to do your work, take care of things at home, or get along with other people: Not difficult at all      No LMP recorded. Patient is postmenopausal.   reports that she is not currently sexually active.  Cancer-related family  history includes Breast cancer in her maternal aunt; Kidney cancer in her father. There is no history of Ovarian cancer.    Mammogram- 5/14/2024  Pap- 3/6/25  Colonoscopy- appt scheduled with [facility] next month   Flu vaccine- never done  Covid vaccine- never done  Tdap- 04/01/2016     reports that she has never smoked. She has never used smokeless tobacco.     Health Maintenance   Topic Date Due    ZOSTER VACCINE (1 of 2) Never done    LIPID PANEL  02/13/2025    COVID-19 Vaccine (1 - 2024-25 season) 02/06/2025 (Originally 9/1/2024)    COLORECTAL CANCER SCREENING  03/18/2025 (Originally 1960)    PAP SMEAR  03/29/2025 (Originally 2/16/2023)    INFLUENZA VACCINE  03/31/2025 (Originally 7/1/2024)    ANNUAL PHYSICAL  02/04/2026    TDAP/TD VACCINES (2 - Td or Tdap) 04/01/2026    MAMMOGRAM  05/14/2026    HEPATITIS C SCREENING  Completed    Pneumococcal Vaccine 0-64  Aged Out       Immunization History   Administered Date(s) Administered    Tdap 04/01/2016       The following portions of the patient's history were reviewed and updated as appropriate: allergies, current medications, past family history, past medical history, past social history, past surgical history and problem list.    Patient Active Problem List   Diagnosis    Elevated glucose    Mixed hyperlipidemia    Vitamin D deficiency    Trigeminal neuralgia    Mass of left side of neck    Coronary artery calcification    Anxiety    Neck pain    Acute cough    Lymphadenopathy    Post covid-19 condition, unspecified    Neuralgia     Allergies   Allergen Reactions    Amoxicillin-Pot Clavulanate Itching, Nausea Only, Other (See Comments), Rash and GI Intolerance    Latex Itching    Compazine [Prochlorperazine] Unknown - High Severity    Flonase [Fluticasone] Other (See Comments)     Jittery     Oxycodone-Acetaminophen Nausea Only    Penicillins Unknown - High Severity    Phenergan [Promethazine] Unknown - High Severity    Sulfamethoxazole-Trimethoprim Nausea Only     Zithromax [Azithromycin] Unknown - High Severity       Current Outpatient Medications:     Ascorbic Acid (VITAMIN C PO), Take 2,000 Units by mouth Daily., Disp: , Rfl:     carBAMazepine XR (TEGretol-XR) 100 MG 12 hr tablet, Take 1 tablet by mouth 2 (Two) Times a Day., Disp: 60 tablet, Rfl: 3    Cholecalciferol (VITAMIN D-3 PO), Take 4,000 Units by mouth Daily., Disp: , Rfl:     clindamycin (CLEOCIN) 300 MG capsule, Take 1 capsule by mouth 4 (Four) Times a Day., Disp: , Rfl:     Cosopt PF 2-0.5 % solution, USE 1 DROP INTO EACH EYE EVERY MORNING AND EVERY NIGHT AT BEDTIME, Disp: , Rfl:     glucose blood test strip, Use as instructed-use one daily Dx e11.9, Disp: 100 each, Rfl: 0    hydrOXYzine (ATARAX) 25 MG tablet, Take 1 tablet by mouth Every 8 (Eight) Hours As Needed for Itching., Disp: 30 tablet, Rfl: 0    ibuprofen (ADVIL,MOTRIN) 600 MG tablet, Take 1 tablet by mouth Every 6 (Six) Hours As Needed for Moderate Pain., Disp: 24 tablet, Rfl: 0    Iyuzeh 0.005 % solution, INSTILL 1 DROP INTO BOTH EYES AT NIGHT, Disp: , Rfl:     Lancets (OneTouch Delica Plus [id number]) misc, Inject 1 Lancet under the skin into the appropriate area as directed 3 (Three) Times a Day As Needed (use to check blood glucose)., Disp: 100 each, Rfl: 0    multivitamin with minerals tablet tablet, Take 1 tablet by mouth Daily., Disp: , Rfl:     Omega-3 Fatty Acids (OMEGA-3 FISH OIL PO), Take  by mouth., Disp: , Rfl:     sodium-potassium-magnesium sulfates (Suprep Bowel Prep Kit) 17.5-3.13-1.6 GM/177ML solution oral solution, Take 1 bottle by mouth Take As Directed. Follow instructions that were mailed to your home. If you didn't receive these call [phone]., Disp: 354 mL, Rfl: 0    Turmeric (QC Tumeric Complex) 500 MG capsule, Take  by mouth., Disp: , Rfl:   No current facility-administered medications for this visit.  New Medications Ordered This Visit   Medications    carBAMazepine XR (TEGretol-XR) 100 MG 12 hr tablet     Sig: Take 1  "tablet by mouth 2 (Two) Times a Day.     Dispense:  60 tablet     Refill:  3    hydrOXYzine (ATARAX) 25 MG tablet     Sig: Take 1 tablet by mouth Every 8 (Eight) Hours As Needed for Itching.     Dispense:  30 tablet     Refill:  0     Past Medical History:   Diagnosis Date    Cataract     Hyperlipidemia     Prediabetes     Trigeminal neuralgia       Past Surgical History:   Procedure Laterality Date     SECTION      KNEE ARTHROSCOPY      TONSILLECTOMY      TUBAL ABDOMINAL LIGATION        Family History   Problem Relation Age of Onset    Hyperlipidemia Mother     Hypertension Mother     Diabetes Mother     Arthritis Mother     Heart attack Mother     Stroke Father     Kidney disease Father     Hyperlipidemia Father     Hypertension Father     Heart attack Father     Diabetes Father     Arthritis Father     Kidney cancer Father     Thyroid disease Sister     Hyperlipidemia Sister     Diabetes Sister     Thyroid disease Brother     Stroke Brother     Hyperlipidemia Brother     Hypertension Brother     Diabetes Brother     Breast cancer Maternal Aunt     Ovarian cancer Neg Hx       Social History     Socioeconomic History    Marital status:    Tobacco Use    Smoking status: Never    Smokeless tobacco: Never   Vaping Use    Vaping status: Never Used   Substance and Sexual Activity    Alcohol use: Never    Drug use: Never    Sexual activity: Not Currently        Objective   Objective   Vital Signs:   Vitals:    25 1107   BP: 110/68   BP Location: Right arm   Patient Position: Sitting   Cuff Size: Adult   Pulse: 66   Resp: 16   Temp: 97.3 °F (36.3 °C)   TempSrc: Infrared   SpO2: 99%   Weight: 55 kg (121 lb 3.2 oz)   Height: 154.9 cm (60.98\")      Body mass index is 22.91 kg/m².  BMI is within normal parameters. No other follow-up for BMI required.    Physical Exam  Vitals reviewed.   Constitutional:       Appearance: Normal appearance. She is normal weight. She is not ill-appearing.   HENT:    "   Head: Normocephalic and atraumatic.        Comments: Small, SK noted to left side of head     Right Ear: Tympanic membrane, ear canal and external ear normal. There is no impacted cerumen.      Left Ear: Tympanic membrane, ear canal and external ear normal. There is no impacted cerumen.      Nose: Nose normal. No congestion or rhinorrhea.      Mouth/Throat:      Mouth: Mucous membranes are moist.      Pharynx: Oropharynx is clear. Uvula midline. No oropharyngeal exudate or posterior oropharyngeal erythema.      Tonsils: No tonsillar exudate or tonsillar abscesses. 0 on the right. 0 on the left.   Eyes:      Extraocular Movements: Extraocular movements intact.      Conjunctiva/sclera: Conjunctivae normal.      Pupils: Pupils are equal, round, and reactive to light.   Neck:      Thyroid: No thyroid mass, thyromegaly or thyroid tenderness.   Cardiovascular:      Rate and Rhythm: Normal rate and regular rhythm.      Pulses: Normal pulses.           Radial pulses are 2+ on the right side and 2+ on the left side.      Heart sounds: Normal heart sounds, S1 normal and S2 normal. No murmur heard.  Pulmonary:      Effort: Pulmonary effort is normal. No tachypnea or respiratory distress.      Breath sounds: Normal breath sounds and air entry. No decreased breath sounds, wheezing or rhonchi.   Abdominal:      General: Abdomen is flat. Bowel sounds are normal.      Palpations: Abdomen is soft.      Tenderness: There is no abdominal tenderness. There is no guarding or rebound.   Musculoskeletal:      Right lower leg: No edema.      Left lower leg: No edema.   Lymphadenopathy:      Cervical: No cervical adenopathy.   Skin:     General: Skin is warm and dry.      Capillary Refill: Capillary refill takes less than 2 seconds.   Neurological:      General: No focal deficit present.      Mental Status: She is alert and oriented to person, place, and time. Mental status is at baseline.      Sensory: Sensation is intact.      Motor:  Motor function is intact.      Coordination: Coordination is intact.      Gait: Gait is intact. Gait normal.   Psychiatric:         Attention and Perception: Attention and perception normal.         Mood and Affect: Mood and affect normal.         Speech: Speech normal.         Behavior: Behavior normal. Behavior is cooperative.         Thought Content: Thought content normal.         Cognition and Memory: Cognition and memory normal.         Judgment: Judgment normal.        Physical Exam        Results      Assessment and Plan        Assessment and Plan    Diagnoses and all orders for this visit:    1. Encounter for annual physical exam (Primary)  -     Comprehensive Metabolic Panel; Future  -     CBC No Differential; Future  -     Lipid panel; Future  -     TSH Rfx On Abnormal To Free T4; Future  -     Vitamin D 25 hydroxy; Future  -     Vitamin B12; Future    2. Prediabetes  -     Comprehensive Metabolic Panel; Future  -     Lipid panel; Future  -     Hemoglobin A1c; Future    3. Mixed hyperlipidemia  -     Comprehensive Metabolic Panel; Future  -     Lipid panel; Future    4. Vitamin D deficiency  -     Vitamin D 25 hydroxy; Future    5. Seborrheic keratosis  -     Ambulatory Referral to Dermatology    6. Trigeminal neuralgia  -     carBAMazepine XR (TEGretol-XR) 100 MG 12 hr tablet; Take 1 tablet by mouth 2 (Two) Times a Day.  Dispense: 60 tablet; Refill: 3    7. Hives  -     hydrOXYzine (ATARAX) 25 MG tablet; Take 1 tablet by mouth Every 8 (Eight) Hours As Needed for Itching.  Dispense: 30 tablet; Refill: 0    8. Encounter for osteoporosis screening in asymptomatic postmenopausal patient  -     DEXA Bone Density Axial    Labs ordered  Will check lipids and A1c.  Declined flu and covid vaccine  UTD on tdap  UTD on mammogram  She is scheduled for colonoscopy next month  Also scheduled for pap on 3/6/25  DEXA ordered  Referral in for dermatology  Trigeminal neuralgia stable-cont current medications as  prescribed. Refills sent. F/u with neurology as directed  Assessment & Plan        Follow Up   Return in about 6 months (around 8/4/2025).    Counseled on health maintenance topics and preventative care recommendations. Follow up yearly for routine physical exam. Diet and exercise counseling given. See dentist and eye doctor yearly as directed.    If a referral was made please contact our office if you have not heard about an appointment in the next 2 weeks.   If labs or images are ordered we will contact you with the results within the next week.  If you have not heard from us after a week please call our office to inquire about the results.    RENETTA Lorenzo    Patient was given instructions and counseling regarding her condition or for health maintenance advice. Please see specific information pulled into the AVS if appropriate.     * Please note that portions of this note were completed with a voice recognition program.

## 2025-02-05 LAB
25(OH)D3 SERPL-MCNC: 39.6 NG/ML (ref 30–100)
ALBUMIN SERPL-MCNC: 4.1 G/DL (ref 3.5–5.2)
ALBUMIN/GLOB SERPL: 1.2 G/DL
ALP SERPL-CCNC: 85 U/L (ref 39–117)
ALT SERPL W P-5'-P-CCNC: 14 U/L (ref 1–33)
ANION GAP SERPL CALCULATED.3IONS-SCNC: 11.3 MMOL/L (ref 5–15)
AST SERPL-CCNC: 20 U/L (ref 1–32)
BILIRUB SERPL-MCNC: 0.3 MG/DL (ref 0–1.2)
BUN SERPL-MCNC: 14 MG/DL (ref 8–23)
BUN/CREAT SERPL: 18.2 (ref 7–25)
CALCIUM SPEC-SCNC: 9.8 MG/DL (ref 8.6–10.5)
CHLORIDE SERPL-SCNC: 106 MMOL/L (ref 98–107)
CHOLEST SERPL-MCNC: 237 MG/DL (ref 0–200)
CO2 SERPL-SCNC: 26.7 MMOL/L (ref 22–29)
CREAT SERPL-MCNC: 0.77 MG/DL (ref 0.57–1)
EGFRCR SERPLBLD CKD-EPI 2021: 86.3 ML/MIN/1.73
GLOBULIN UR ELPH-MCNC: 3.5 GM/DL
GLUCOSE SERPL-MCNC: 90 MG/DL (ref 65–99)
HDLC SERPL-MCNC: 52 MG/DL (ref 40–60)
LDLC SERPL CALC-MCNC: 151 MG/DL (ref 0–100)
LDLC/HDLC SERPL: 2.84 {RATIO}
POTASSIUM SERPL-SCNC: 4.1 MMOL/L (ref 3.5–5.2)
PROT SERPL-MCNC: 7.6 G/DL (ref 6–8.5)
SODIUM SERPL-SCNC: 144 MMOL/L (ref 136–145)
TRIGL SERPL-MCNC: 186 MG/DL (ref 0–150)
TSH SERPL DL<=0.05 MIU/L-ACNC: 1.23 UIU/ML (ref 0.27–4.2)
VIT B12 BLD-MCNC: 377 PG/ML (ref 211–946)
VLDLC SERPL-MCNC: 34 MG/DL (ref 5–40)

## 2025-02-05 NOTE — PROGRESS NOTES
Please notify Melly that the MRI of her brain and MRA of her brain appear normal for her age and medical history. Me and my collaborating physician Dr. Merari Perez both reviewed her imaging. No concerning findings. No abnormalities to explain face pain. We will follow up as scheduled in clinic. Thanks, RENETTA Marin

## 2025-02-07 ENCOUNTER — TELEPHONE (OUTPATIENT)
Dept: NEUROLOGY | Facility: CLINIC | Age: 65
End: 2025-02-07
Payer: MEDICAID

## 2025-02-07 NOTE — TELEPHONE ENCOUNTER
----- Message from Odalis Kyle sent at 2/5/2025 12:40 PM EST -----  Please notify Melly that the MRI of her brain and MRA of her brain appear normal for her age and medical history. Me and my collaborating physician Dr. Merari Perez both reviewed her imaging. No concerning findings. No abnormalities to explain face pain. We will follow up as scheduled in clinic. Thanks, RENETTA Marin

## 2025-02-13 ENCOUNTER — TELEPHONE (OUTPATIENT)
Dept: INTERNAL MEDICINE | Facility: CLINIC | Age: 65
End: 2025-02-13
Payer: MEDICAID

## 2025-02-13 NOTE — TELEPHONE ENCOUNTER
Caller: Melly Cantor    Relationship: Self    Best call back number:[phone]      What test was performed: LABS    When was the test performed: 2/4/25    Where was the test performed: IN OFFICE    Additional notes: PATIENT WANTS A PHYSICAL COPY

## 2025-02-20 ENCOUNTER — OFFICE VISIT (OUTPATIENT)
Dept: INTERNAL MEDICINE | Facility: CLINIC | Age: 65
End: 2025-02-20
Payer: MEDICAID

## 2025-02-20 VITALS
TEMPERATURE: 97 F | DIASTOLIC BLOOD PRESSURE: 70 MMHG | OXYGEN SATURATION: 98 % | WEIGHT: 122.2 LBS | BODY MASS INDEX: 23.07 KG/M2 | HEART RATE: 84 BPM | HEIGHT: 61 IN | SYSTOLIC BLOOD PRESSURE: 112 MMHG | RESPIRATION RATE: 14 BRPM

## 2025-02-20 DIAGNOSIS — J20.9 ACUTE BRONCHITIS, UNSPECIFIED ORGANISM: Primary | ICD-10-CM

## 2025-02-20 DIAGNOSIS — R05.1 ACUTE COUGH: ICD-10-CM

## 2025-02-20 DIAGNOSIS — J02.9 SORE THROAT: ICD-10-CM

## 2025-02-20 PROCEDURE — 87428 SARSCOV & INF VIR A&B AG IA: CPT | Performed by: NURSE PRACTITIONER

## 2025-02-20 PROCEDURE — 87880 STREP A ASSAY W/OPTIC: CPT | Performed by: NURSE PRACTITIONER

## 2025-02-20 PROCEDURE — 1125F AMNT PAIN NOTED PAIN PRSNT: CPT | Performed by: NURSE PRACTITIONER

## 2025-02-20 PROCEDURE — 99214 OFFICE O/P EST MOD 30 MIN: CPT | Performed by: NURSE PRACTITIONER

## 2025-02-20 RX ORDER — DOXYCYCLINE 100 MG/1
100 CAPSULE ORAL 2 TIMES DAILY
Qty: 20 CAPSULE | Refills: 0 | Status: SHIPPED | OUTPATIENT
Start: 2025-02-20 | End: 2025-03-02

## 2025-02-20 RX ORDER — BROMPHENIRAMINE MALEATE, PSEUDOEPHEDRINE HYDROCHLORIDE, AND DEXTROMETHORPHAN HYDROBROMIDE 2; 30; 10 MG/5ML; MG/5ML; MG/5ML
10 SYRUP ORAL 4 TIMES DAILY PRN
Qty: 300 ML | Refills: 0 | Status: SHIPPED | OUTPATIENT
Start: 2025-02-20

## 2025-02-20 NOTE — PROGRESS NOTES
Subjective   Melly Cantor is a 64 y.o. female    Chief Complaint   Patient presents with    Sore Throat     Started Monday, no fever.    Cough     Sore Throat   This is a new problem. The current episode started in the past 7 days. The problem has been unchanged.   Pain is worse on both side(s). There has been no fever. The pain is mild. Associated symptoms include congestion, coughing and a hoarse voice. Pertinent negatives include no abdominal pain, diarrhea, drooling, ear pain, headaches, neck pain, shortness of breath, swollen glands, trouble swallowing or vomiting. She has had exposure to flu. She has tried nothing for the symptoms. The treatment provided no relief.   Cough  This is a new problem. The current episode started in the past 7 days. The problem has been unchanged. The problem occurs every few minutes. The cough is Dry. Associated symptoms include nasal congestion, postnasal drip and a sore throat. Pertinent negatives include no chest pain, chills, ear congestion, ear pain, fever, headaches, heartburn, hemoptysis, myalgias, rash, rhinorrhea, shortness of breath, sweats, weight loss or wheezing. Nothing aggravates the symptoms. She has tried nothing for the symptoms. The treatment provided no relief.        The following portions of the patient's history were reviewed and updated as appropriate: allergies, current medications, past family history, past medical history, past social history, past surgical history, and problem list.    Current Outpatient Medications:     Ascorbic Acid (VITAMIN C PO), Take 2,000 Units by mouth Daily., Disp: , Rfl:     carBAMazepine XR (TEGretol-XR) 100 MG 12 hr tablet, Take 1 tablet by mouth 2 (Two) Times a Day., Disp: 60 tablet, Rfl: 3    Cholecalciferol (VITAMIN D-3 PO), Take 4,000 Units by mouth Daily., Disp: , Rfl:     clindamycin (CLEOCIN) 300 MG capsule, Take 1 capsule by mouth 4 (Four) Times a Day., Disp: , Rfl:     Cosopt PF 2-0.5 % solution, USE 1 DROP INTO  EACH EYE EVERY MORNING AND EVERY NIGHT AT BEDTIME, Disp: , Rfl:     glucose blood test strip, Use as instructed-use one daily Dx e11.9, Disp: 100 each, Rfl: 0    hydrOXYzine (ATARAX) 25 MG tablet, Take 1 tablet by mouth Every 8 (Eight) Hours As Needed for Itching., Disp: 30 tablet, Rfl: 0    ibuprofen (ADVIL,MOTRIN) 600 MG tablet, Take 1 tablet by mouth Every 6 (Six) Hours As Needed for Moderate Pain., Disp: 24 tablet, Rfl: 0    Iyuzeh 0.005 % solution, INSTILL 1 DROP INTO BOTH EYES AT NIGHT, Disp: , Rfl:     Lancets (OneTouch Delica Plus [id number]) misc, Inject 1 Lancet under the skin into the appropriate area as directed 3 (Three) Times a Day As Needed (use to check blood glucose)., Disp: 100 each, Rfl: 0    multivitamin with minerals tablet tablet, Take 1 tablet by mouth Daily., Disp: , Rfl:     Omega-3 Fatty Acids (OMEGA-3 FISH OIL PO), Take  by mouth., Disp: , Rfl:     sodium-potassium-magnesium sulfates (Suprep Bowel Prep Kit) 17.5-3.13-1.6 GM/177ML solution oral solution, Take 1 bottle by mouth Take As Directed. Follow instructions that were mailed to your home. If you didn't receive these call [phone]., Disp: 354 mL, Rfl: 0    Turmeric (QC Tumeric Complex) 500 MG capsule, Take  by mouth., Disp: , Rfl:     brompheniramine-pseudoephedrine-DM 30-2-10 MG/5ML syrup, Take 10 mL by mouth 4 (Four) Times a Day As Needed for Congestion or Cough., Disp: 300 mL, Rfl: 0    doxycycline (VIBRAMYCIN) 100 MG capsule, Take 1 capsule by mouth 2 (Two) Times a Day for 10 days., Disp: 20 capsule, Rfl: 0     Review of Systems   Constitutional:  Negative for chills, fatigue, fever and weight loss.   HENT:  Positive for congestion, hoarse voice, postnasal drip and sore throat. Negative for drooling, ear pain, rhinorrhea and trouble swallowing.    Respiratory:  Positive for cough. Negative for hemoptysis, chest tightness, shortness of breath and wheezing.    Cardiovascular:  Negative for chest pain.   Gastrointestinal:   "Negative for abdominal pain, diarrhea, heartburn, nausea and vomiting.   Endocrine: Negative for cold intolerance and heat intolerance.   Musculoskeletal:  Negative for arthralgias, myalgias and neck pain.   Skin:  Negative for rash.   Neurological:  Negative for dizziness and headaches.       Objective   Physical Exam  Constitutional:       Appearance: She is well-developed.   HENT:      Head: Normocephalic and atraumatic.      Right Ear: A middle ear effusion is present.      Left Ear: A middle ear effusion is present.      Nose:      Right Sinus: No maxillary sinus tenderness or frontal sinus tenderness.      Left Sinus: No maxillary sinus tenderness or frontal sinus tenderness.      Mouth/Throat:      Pharynx: Posterior oropharyngeal erythema and postnasal drip present.   Eyes:      Conjunctiva/sclera: Conjunctivae normal.      Pupils: Pupils are equal, round, and reactive to light.   Cardiovascular:      Rate and Rhythm: Normal rate and regular rhythm.      Heart sounds: Normal heart sounds.   Pulmonary:      Effort: Pulmonary effort is normal.      Breath sounds: Normal breath sounds.   Abdominal:      General: Bowel sounds are normal.      Palpations: Abdomen is soft.   Musculoskeletal:         General: Normal range of motion.      Cervical back: Normal range of motion.   Skin:     General: Skin is warm and dry.   Neurological:      Mental Status: She is alert and oriented to person, place, and time.      Deep Tendon Reflexes: Reflexes are normal and symmetric.   Psychiatric:         Behavior: Behavior normal.         Thought Content: Thought content normal.         Judgment: Judgment normal.       Vitals:    02/20/25 0950   BP: 112/70   BP Location: Left arm   Patient Position: Sitting   Cuff Size: Adult   Pulse: 84   Resp: 14   Temp: 97 °F (36.1 °C)   TempSrc: Temporal   SpO2: 98%   Weight: 55.4 kg (122 lb 3.2 oz)   Height: 154.9 cm (60.98\")         Assessment & Plan   Diagnoses and all orders for this " visit:    1. Acute bronchitis, unspecified organism (Primary)  -     doxycycline (VIBRAMYCIN) 100 MG capsule; Take 1 capsule by mouth 2 (Two) Times a Day for 10 days.  Dispense: 20 capsule; Refill: 0  -     brompheniramine-pseudoephedrine-DM 30-2-10 MG/5ML syrup; Take 10 mL by mouth 4 (Four) Times a Day As Needed for Congestion or Cough.  Dispense: 300 mL; Refill: 0    2. Acute cough  -     POCT SARS-CoV-2 Antigen JOSE M + Flu    3. Sore throat  -     POC Rapid Strep A      Strep, flu and covid tests all negative  Covered with Doxy  Bromfed PRN  Increase fluids  RTC if sx's worsen or do not improve

## 2025-03-03 ENCOUNTER — TELEPHONE (OUTPATIENT)
Dept: INTERNAL MEDICINE | Facility: CLINIC | Age: 65
End: 2025-03-03
Payer: MEDICAID

## 2025-03-03 NOTE — TELEPHONE ENCOUNTER
HUB can relay:  Please relay the following message to patient as it was sent via [employer] and has not yet been viewed by patient      Erik Pickard,  Your cholesterol and triglycerides have gone up slightly since last year. Really focus on diet with lean meats (chicken, fish, turkey), fresh fruits and veggies for fiber and watching carbohydrate intake. As the weather gets nicer try to also increase your physical activity, getting a minimum of 150 minutes of moderate intensity exercise.        All other labs are within the normal/acceptable range.

## 2025-03-03 NOTE — TELEPHONE ENCOUNTER
----- Message from Jacquelyn Gamino sent at 3/3/2025  9:48 AM EST -----  Please relay the following message to patient as it was sent via [employer] and has not yet been viewed by patient      Hi Melly,  Your cholesterol and triglycerides have gone up slightly since last year. Really focus on diet with lean meats (chicken, fish, turkey), fresh fruits and veggies for fiber and watching carbohydrate intake. As the weather gets nicer try to also increase your physical activity, getting a minimum of 150 minutes of moderate intensity exercise.        All other labs are within the normal/acceptable range.

## 2025-03-04 NOTE — TELEPHONE ENCOUNTER
Called and spoke with patient, answered additional questions that she had regarding her labs. She verbalized understanding and had no further questions.

## 2025-03-04 NOTE — TELEPHONE ENCOUNTER
Name: Melly Cantor Ann    Relationship: Self    Best Callback Number:      HUB PROVIDED THE RELAY MESSAGE FROM THE OFFICE   PATIENT HAS FURTHER QUESTIONS AND WOULD LIKE A CALL BACK    ADDITIONAL INFORMATION:  PLEASE CALL TO ADVISE

## 2025-04-09 DIAGNOSIS — R73.09 ELEVATED GLUCOSE: ICD-10-CM

## 2025-04-09 NOTE — TELEPHONE ENCOUNTER
Caller: Melly Cantor    Relationship: Self    Best call back number: [phone]     Requested Prescriptions:   Requested Prescriptions     Pending Prescriptions Disp Refills    glucose blood test strip 100 each 0     Sig: Use as instructed-use one daily  Dx e11.9        [facility] be sent[id number] [place] [address] [facility] [phone] [facility] [phone] FX     Last office visit with prescribing clinician: 2/4/2025   Last telemedicine visit with prescribing clinician: Visit date not found   Next office visit with prescribing clinician: Visit date not found     Additional details provided by patient: PATIENT IS OUT    Does the patient have less than a 3 day supply:  [x] Yes  [] No    Would you like a call back once the refill request has been completed: [] Yes [x] No    If the office needs to give you a call back, can they leave a voicemail: [] Yes [x] No    Cici Amin Rep   04/09/25 15:41 EDT

## 2025-04-09 NOTE — TELEPHONE ENCOUNTER
Rx Refill Note  Requested Prescriptions     Pending Prescriptions Disp Refills    glucose blood test strip 100 each 0     Sig: Use as instructed-use one daily  Dx e11.9      Last office visit with prescribing clinician: 2/4/2025     Next office visit with prescribing clinician: Visit date not found       Nialm Shafer  04/09/25, 16:03 EDT

## 2025-04-11 DIAGNOSIS — L50.9 HIVES: ICD-10-CM

## 2025-04-11 RX ORDER — HYDROXYZINE HYDROCHLORIDE 25 MG/1
25 TABLET, FILM COATED ORAL EVERY 8 HOURS PRN
Qty: 30 TABLET | Refills: 0 | Status: CANCELLED | OUTPATIENT
Start: 2025-04-11

## 2025-04-11 NOTE — TELEPHONE ENCOUNTER
Caller: Melly Cantor    Relationship: Self    Best call back number: [phone]     Requested Prescriptions:   Requested Prescriptions     Pending Prescriptions Disp Refills    hydrOXYzine (ATARAX) 25 MG tablet 30 tablet 0     Sig: Take 1 tablet by mouth Every 8 (Eight) Hours As Needed for Itching.        Pharmacy where request should be sent: [id number] - [place] [address] AT [facility] [address] [facility] [phone] [facility] [phone]      Last office visit with prescribing clinician: 2/4/2025   Last telemedicine visit with prescribing clinician: Visit date not found   Next office visit with prescribing clinician: Visit date not found     Additional details provided by patient: PATIENT IS REQUESTING THE TEVA BRAND OF THIS MEDICATION. PATIENT HAS 4 PILLS ON HAND     Does the patient have less than a 3 day supply:  [x] Yes  [] No    Would you like a call back once the refill request has been completed: [] Yes [] No    If the office needs to give you a call back, can they leave a voicemail: [] Yes [] No    Cici Barney Rep   04/11/25 08:25 EDT

## 2025-04-14 DIAGNOSIS — L50.9 HIVES: ICD-10-CM

## 2025-04-15 RX ORDER — HYDROXYZINE HYDROCHLORIDE 25 MG/1
25 TABLET, FILM COATED ORAL EVERY 8 HOURS PRN
Qty: 30 TABLET | Refills: 5 | Status: SHIPPED | OUTPATIENT
Start: 2025-04-15

## 2025-05-01 NOTE — TELEPHONE ENCOUNTER
----- Message from Dr. Jin Hwang MD sent at 5/1/2025 12:47 PM EDT -----  If he's symptomatic let's transfuse 2 units PRBCs.  ----- Message -----  From: Hernan Mohan MA  Sent: 5/1/2025  11:50 AM EDT  To: Jin Hwang MD     I spoke with the pt he stated some days he has no energy and feels fatigue his daughter is going to call an make an appointment to see GI.   Spoke with patient, informed of results. Verbalized understanding and had no further questions.

## 2025-06-09 ENCOUNTER — HOSPITAL ENCOUNTER (OUTPATIENT)
Dept: MAMMOGRAPHY | Facility: HOSPITAL | Age: 65
Discharge: HOME | End: 2025-06-09
Admitting: ADVANCED PRACTICE MIDWIFE
Payer: MEDICARE

## 2025-06-09 DIAGNOSIS — Z12.31 VISIT FOR SCREENING MAMMOGRAM: ICD-10-CM

## 2025-06-09 PROCEDURE — 77067 SCR MAMMO BI INCL CAD: CPT

## 2025-06-09 PROCEDURE — 77063 BREAST TOMOSYNTHESIS BI: CPT

## 2025-06-11 ENCOUNTER — LAB (OUTPATIENT)
Dept: LAB | Facility: HOSPITAL | Age: 65
End: 2025-06-11
Payer: MEDICARE

## 2025-06-11 ENCOUNTER — TRANSCRIBE ORDERS (OUTPATIENT)
Dept: LAB | Facility: HOSPITAL | Age: 65
End: 2025-06-11
Payer: MEDICARE

## 2025-06-11 ENCOUNTER — OFFICE VISIT (OUTPATIENT)
Dept: INTERNAL MEDICINE | Facility: CLINIC | Age: 65
End: 2025-06-11
Payer: MEDICARE

## 2025-06-11 VITALS
DIASTOLIC BLOOD PRESSURE: 62 MMHG | TEMPERATURE: 98 F | BODY MASS INDEX: 23.37 KG/M2 | HEART RATE: 76 BPM | OXYGEN SATURATION: 97 % | SYSTOLIC BLOOD PRESSURE: 114 MMHG | HEIGHT: 61 IN | WEIGHT: 123.8 LBS

## 2025-06-11 DIAGNOSIS — S81.811D LACERATION OF RIGHT LOWER EXTREMITY, SUBSEQUENT ENCOUNTER: Primary | ICD-10-CM

## 2025-06-11 DIAGNOSIS — L50.9 HIVES: ICD-10-CM

## 2025-06-11 DIAGNOSIS — Z01.419 ROUTINE GYNECOLOGICAL EXAMINATION: Primary | ICD-10-CM

## 2025-06-11 LAB
25(OH)D3 SERPL-MCNC: 32.2 NG/ML (ref 30–100)
ALBUMIN SERPL-MCNC: 4.3 G/DL (ref 3.5–5.2)
ALBUMIN/GLOB SERPL: 1.7 G/DL
ALP SERPL-CCNC: 80 U/L (ref 39–117)
ALT SERPL W P-5'-P-CCNC: 13 U/L (ref 1–33)
ANION GAP SERPL CALCULATED.3IONS-SCNC: 10 MMOL/L (ref 5–15)
AST SERPL-CCNC: 18 U/L (ref 1–32)
BILIRUB SERPL-MCNC: 0.5 MG/DL (ref 0–1.2)
BUN SERPL-MCNC: 11.9 MG/DL (ref 8–23)
BUN/CREAT SERPL: 17.8 (ref 7–25)
CALCIUM SPEC-SCNC: 9.3 MG/DL (ref 8.6–10.5)
CHLORIDE SERPL-SCNC: 104 MMOL/L (ref 98–107)
CHOLEST SERPL-MCNC: 204 MG/DL (ref 0–200)
CO2 SERPL-SCNC: 28 MMOL/L (ref 22–29)
CREAT SERPL-MCNC: 0.67 MG/DL (ref 0.57–1)
DEPRECATED RDW RBC AUTO: 44.1 FL (ref 37–54)
EGFRCR SERPLBLD CKD-EPI 2021: 97.1 ML/MIN/1.73
ERYTHROCYTE [DISTWIDTH] IN BLOOD BY AUTOMATED COUNT: 13.5 % (ref 12.3–15.4)
GLOBULIN UR ELPH-MCNC: 2.5 GM/DL
GLUCOSE SERPL-MCNC: 91 MG/DL (ref 65–99)
HBA1C MFR BLD: 5.8 % (ref 4.8–5.6)
HCT VFR BLD AUTO: 41.8 % (ref 34–46.6)
HDLC SERPL QL: 3.71
HDLC SERPL-MCNC: 55 MG/DL (ref 40–60)
HGB BLD-MCNC: 13.3 G/DL (ref 12–15.9)
LDLC SERPL CALC-MCNC: 132 MG/DL (ref 0–100)
MCH RBC QN AUTO: 28.3 PG (ref 26.6–33)
MCHC RBC AUTO-ENTMCNC: 31.8 G/DL (ref 31.5–35.7)
MCV RBC AUTO: 88.9 FL (ref 79–97)
PLATELET # BLD AUTO: 239 10*3/MM3 (ref 140–450)
PMV BLD AUTO: 11 FL (ref 6–12)
POTASSIUM SERPL-SCNC: 4.6 MMOL/L (ref 3.5–5.2)
PROT SERPL-MCNC: 6.8 G/DL (ref 6–8.5)
RBC # BLD AUTO: 4.7 10*6/MM3 (ref 3.77–5.28)
SODIUM SERPL-SCNC: 142 MMOL/L (ref 136–145)
TRIGL SERPL-MCNC: 95 MG/DL (ref 0–150)
TSH SERPL DL<=0.05 MIU/L-ACNC: 1.14 UIU/ML (ref 0.27–4.2)
VLDLC SERPL-MCNC: 17 MG/DL (ref 5–40)
WBC NRBC COR # BLD AUTO: 6.02 10*3/MM3 (ref 3.4–10.8)

## 2025-06-11 PROCEDURE — 36415 COLL VENOUS BLD VENIPUNCTURE: CPT

## 2025-06-11 PROCEDURE — 82306 VITAMIN D 25 HYDROXY: CPT

## 2025-06-11 PROCEDURE — 83036 HEMOGLOBIN GLYCOSYLATED A1C: CPT

## 2025-06-11 PROCEDURE — 80061 LIPID PANEL: CPT

## 2025-06-11 PROCEDURE — 80053 COMPREHEN METABOLIC PANEL: CPT

## 2025-06-11 PROCEDURE — 85027 COMPLETE CBC AUTOMATED: CPT

## 2025-06-11 PROCEDURE — 84443 ASSAY THYROID STIM HORMONE: CPT

## 2025-06-11 RX ORDER — HYDROXYZINE HYDROCHLORIDE 25 MG/1
25 TABLET, FILM COATED ORAL EVERY 8 HOURS PRN
Qty: 30 TABLET | Refills: 5 | Status: SHIPPED | OUTPATIENT
Start: 2025-06-11

## 2025-06-11 NOTE — PROGRESS NOTES
Office Note     Name: Melly Cantor    : 1960     MRN: [id number]     Chief Complaint  Extremity Laceration (Last week cut on piece of glass went to U. Wanting to make sure its not infected no pain or discomfort)    Subjective     History of Present Illness:  Melly Cantor is a 65 y.o. female who presents today for follow-up skin laceration.     Right shin cut after taking out trash and cut by piece of glass  Has had steristrips in place in May 1  Concerned its infected  It has not bled or drained   She has kept the same steri strips in place    Review of Systems:   Review of Systems    Past Medical History:   Past Medical History:   Diagnosis Date    Cataract     Hyperlipidemia     Prediabetes     Trigeminal neuralgia        Past Surgical History:   Past Surgical History:   Procedure Laterality Date     SECTION      KNEE ARTHROSCOPY  2003    TONSILLECTOMY      TUBAL ABDOMINAL LIGATION         Family History:   Family History   Problem Relation Age of Onset    Hyperlipidemia Mother     Hypertension Mother     Diabetes Mother     Arthritis Mother     Heart attack Mother     Stroke Father     Kidney disease Father     Hyperlipidemia Father     Hypertension Father     Heart attack Father     Diabetes Father     Arthritis Father     Kidney cancer Father     Thyroid disease Sister     Hyperlipidemia Sister     Diabetes Sister     Thyroid disease Brother     Stroke Brother     Hyperlipidemia Brother     Hypertension Brother     Diabetes Brother     Breast cancer Maternal Aunt     Ovarian cancer Neg Hx        Social History:   Social History     Socioeconomic History    Marital status:    Tobacco Use    Smoking status: Never    Smokeless tobacco: Never   Vaping Use    Vaping status: Never Used   Substance and Sexual Activity    Alcohol use: Never    Drug use: Never    Sexual activity: Not Currently       Immunizations:   Immunization History   Administered Date(s) Administered     Tdap 04/01/2016        Medications:     Current Outpatient Medications:     Ascorbic Acid (VITAMIN C PO), Take 2,000 Units by mouth Daily., Disp: , Rfl:     brompheniramine-pseudoephedrine-DM 30-2-10 MG/5ML syrup, Take 10 mL by mouth 4 (Four) Times a Day As Needed for Congestion or Cough., Disp: 300 mL, Rfl: 0    carBAMazepine XR (TEGretol-XR) 100 MG 12 hr tablet, Take 1 tablet by mouth 2 (Two) Times a Day., Disp: 60 tablet, Rfl: 3    Cholecalciferol (VITAMIN D-3 PO), Take 4,000 Units by mouth Daily., Disp: , Rfl:     clindamycin (CLEOCIN) 300 MG capsule, Take 1 capsule by mouth 4 (Four) Times a Day., Disp: , Rfl:     Cosopt PF 2-0.5 % solution, USE 1 DROP INTO EACH EYE EVERY MORNING AND EVERY NIGHT AT BEDTIME, Disp: , Rfl:     glucose blood test strip, Use as instructed-use one daily Dx e11.9, Disp: 100 each, Rfl: 0    hydrOXYzine (ATARAX) 25 MG tablet, Take 1 tablet by mouth Every 8 (Eight) Hours As Needed for Itching., Disp: 30 tablet, Rfl: 5    ibuprofen (ADVIL,MOTRIN) 600 MG tablet, Take 1 tablet by mouth Every 6 (Six) Hours As Needed for Moderate Pain., Disp: 24 tablet, Rfl: 0    Iyuzeh 0.005 % solution, INSTILL 1 DROP INTO BOTH EYES AT NIGHT, Disp: , Rfl:     Lancets (OneTouch Delica Plus [id number]) misc, Inject 1 Lancet under the skin into the appropriate area as directed 3 (Three) Times a Day As Needed (use to check blood glucose)., Disp: 100 each, Rfl: 0    multivitamin with minerals tablet tablet, Take 1 tablet by mouth Daily., Disp: , Rfl:     Omega-3 Fatty Acids (OMEGA-3 FISH OIL PO), Take  by mouth., Disp: , Rfl:     sodium-potassium-magnesium sulfates (Suprep Bowel Prep Kit) 17.5-3.13-1.6 GM/177ML solution oral solution, Take 1 bottle by mouth Take As Directed. Follow instructions that were mailed to your home. If you didn't receive these call [phone]., Disp: 354 mL, Rfl: 0    Turmeric (QC Tumeric Complex) 500 MG capsule, Take  by mouth., Disp: , Rfl:     Allergies:   Allergies   Allergen  "Reactions    Amoxicillin-Pot Clavulanate Itching, Nausea Only, Other (See Comments), Rash and GI Intolerance    Latex Itching    Compazine [Prochlorperazine] Unknown - High Severity    Flonase [Fluticasone] Other (See Comments)     Jittery     Oxycodone-Acetaminophen Nausea Only    Penicillins Unknown - High Severity    Phenergan [Promethazine] Unknown - High Severity    Sulfamethoxazole-Trimethoprim Nausea Only    Zithromax [Azithromycin] Unknown - High Severity       Objective     Vital Signs  /62   Pulse 76   Temp 98 °F (36.7 °C) (Infrared)   Ht 154.9 cm (60.98\")   Wt 56.2 kg (123 lb 12.8 oz)   SpO2 97%   BMI 23.40 kg/m²   Estimated body mass index is 23.4 kg/m² as calculated from the following:    Height as of this encounter: 154.9 cm (60.98\").    Weight as of this encounter: 56.2 kg (123 lb 12.8 oz).    BMI is within normal parameters. No other follow-up for BMI required.       Physical Exam  Skin:     Comments: Deep laceration to anterior right shin. Edges are well approximated but without re epithelization in center wound.           Procedures     Results:  Recent Results (from the past 24 hours)   CBC (No Diff)    Collection Time: 06/11/25 11:01 AM    Specimen: Blood   Result Value Ref Range    WBC 6.02 3.40 - 10.80 10*3/mm3    RBC 4.70 3.77 - 5.28 10*6/mm3    Hemoglobin 13.3 12.0 - 15.9 g/dL    Hematocrit 41.8 34.0 - 46.6 %    MCV 88.9 79.0 - 97.0 fL    MCH 28.3 26.6 - 33.0 pg    MCHC 31.8 31.5 - 35.7 g/dL    RDW 13.5 12.3 - 15.4 %    RDW-SD 44.1 37.0 - 54.0 fl    MPV 11.0 6.0 - 12.0 fL    Platelets 239 140 - 450 10*3/mm3   Comprehensive Metabolic Panel    Collection Time: 06/11/25 11:01 AM    Specimen: Blood   Result Value Ref Range    Glucose 91 65 - 99 mg/dL    BUN 11.9 8.0 - 23.0 mg/dL    Creatinine 0.67 0.57 - 1.00 mg/dL    Sodium 142 136 - 145 mmol/L    Potassium 4.6 3.5 - 5.2 mmol/L    Chloride 104 98 - 107 mmol/L    CO2 28.0 22.0 - 29.0 mmol/L    Calcium 9.3 8.6 - 10.5 mg/dL    Total " Protein 6.8 6.0 - 8.5 g/dL    Albumin 4.3 3.5 - 5.2 g/dL    ALT (SGPT) 13 1 - 33 U/L    AST (SGOT) 18 1 - 32 U/L    Alkaline Phosphatase 80 39 - 117 U/L    Total Bilirubin 0.5 0.0 - 1.2 mg/dL    Globulin 2.5 gm/dL    A/G Ratio 1.7 g/dL    BUN/Creatinine Ratio 17.8 7.0 - 25.0    Anion Gap 10.0 5.0 - 15.0 mmol/L    eGFR 97.1 >60.0 mL/min/1.73        Assessment and Plan     Assessment/Plan:  Diagnoses and all orders for this visit:    1. Laceration of right lower extremity, subsequent encounter (Primary)    2. Hives  -     hydrOXYzine (ATARAX) 25 MG tablet; Take 1 tablet by mouth Every 8 (Eight) Hours As Needed for Itching.  Dispense: 30 tablet; Refill: 5    Laceration is healing. Steri strips removed, wound irrigated and antibacterial ointment applied. Three new steri strips applied. Follow-up next week for wound check. Tetanus has been updated.     Follow Up  No follow-ups on file.    Angelina Izquierdo MD   [facility] [place] [facility]

## 2025-06-18 ENCOUNTER — OFFICE VISIT (OUTPATIENT)
Dept: INTERNAL MEDICINE | Facility: CLINIC | Age: 65
End: 2025-06-18
Payer: MEDICARE

## 2025-06-18 VITALS
HEIGHT: 61 IN | WEIGHT: 122.6 LBS | OXYGEN SATURATION: 99 % | BODY MASS INDEX: 23.15 KG/M2 | SYSTOLIC BLOOD PRESSURE: 106 MMHG | TEMPERATURE: 97.1 F | DIASTOLIC BLOOD PRESSURE: 62 MMHG | HEART RATE: 88 BPM

## 2025-06-18 DIAGNOSIS — Z51.89 ENCOUNTER FOR WOUND RE-CHECK: Primary | ICD-10-CM

## 2025-06-18 NOTE — PROGRESS NOTES
Office Note     Name: Melly Cantor    : 1960     MRN: [id number]     Chief Complaint  Primary Care Follow-Up (Cut on left leg. Thinks it may look a little better no pain)    Subjective     History of Present Illness:  Melly Cantor is a 65 y.o. female who presents today for follow-up laceration     Sustained injury early May    Right shin cut after taking out trash and cut by piece of glass  Improving since prior visit.   No redness drainage or throbbing  She has showered  The steri-strips are still in place.       Review of Systems:   Review of Systems    Past Medical History:   Past Medical History:   Diagnosis Date    Cataract     Hyperlipidemia     Prediabetes     Trigeminal neuralgia        Past Surgical History:   Past Surgical History:   Procedure Laterality Date     SECTION      KNEE ARTHROSCOPY      TONSILLECTOMY      TUBAL ABDOMINAL LIGATION         Family History:   Family History   Problem Relation Age of Onset    Hyperlipidemia Mother     Hypertension Mother     Diabetes Mother     Arthritis Mother     Heart attack Mother     Stroke Father     Kidney disease Father     Hyperlipidemia Father     Hypertension Father     Heart attack Father     Diabetes Father     Arthritis Father     Kidney cancer Father     Thyroid disease Sister     Hyperlipidemia Sister     Diabetes Sister     Thyroid disease Brother     Stroke Brother     Hyperlipidemia Brother     Hypertension Brother     Diabetes Brother     Breast cancer Maternal Aunt     Ovarian cancer Neg Hx        Social History:   Social History     Socioeconomic History    Marital status:    Tobacco Use    Smoking status: Never    Smokeless tobacco: Never   Vaping Use    Vaping status: Never Used   Substance and Sexual Activity    Alcohol use: Never    Drug use: Never    Sexual activity: Not Currently       Immunizations:   Immunization History   Administered Date(s) Administered    Tdap 2016         Medications:     Current Outpatient Medications:     Ascorbic Acid (VITAMIN C PO), Take 2,000 Units by mouth Daily., Disp: , Rfl:     carBAMazepine XR (TEGretol-XR) 100 MG 12 hr tablet, Take 1 tablet by mouth 2 (Two) Times a Day., Disp: 60 tablet, Rfl: 3    Cholecalciferol (VITAMIN D-3 PO), Take 4,000 Units by mouth Daily., Disp: , Rfl:     clindamycin (CLEOCIN) 300 MG capsule, Take 1 capsule by mouth 4 (Four) Times a Day., Disp: , Rfl:     Cosopt PF 2-0.5 % solution, USE 1 DROP INTO EACH EYE EVERY MORNING AND EVERY NIGHT AT BEDTIME, Disp: , Rfl:     glucose blood test strip, Use as instructed-use one daily Dx e11.9, Disp: 100 each, Rfl: 0    hydrOXYzine (ATARAX) 25 MG tablet, Take 1 tablet by mouth Every 8 (Eight) Hours As Needed for Itching., Disp: 30 tablet, Rfl: 5    ibuprofen (ADVIL,MOTRIN) 600 MG tablet, Take 1 tablet by mouth Every 6 (Six) Hours As Needed for Moderate Pain., Disp: 24 tablet, Rfl: 0    Iyuzeh 0.005 % solution, INSTILL 1 DROP INTO BOTH EYES AT NIGHT, Disp: , Rfl:     Lancets (OneTouch Delica Plus [id number]) misc, Inject 1 Lancet under the skin into the appropriate area as directed 3 (Three) Times a Day As Needed (use to check blood glucose)., Disp: 100 each, Rfl: 0    multivitamin with minerals tablet tablet, Take 1 tablet by mouth Daily., Disp: , Rfl:     Omega-3 Fatty Acids (OMEGA-3 FISH OIL PO), Take  by mouth., Disp: , Rfl:     sodium-potassium-magnesium sulfates (Suprep Bowel Prep Kit) 17.5-3.13-1.6 GM/177ML solution oral solution, Take 1 bottle by mouth Take As Directed. Follow instructions that were mailed to your home. If you didn't receive these call [phone]., Disp: 354 mL, Rfl: 0    Turmeric (QC Tumeric Complex) 500 MG capsule, Take  by mouth., Disp: , Rfl:     brompheniramine-pseudoephedrine-DM 30-2-10 MG/5ML syrup, Take 10 mL by mouth 4 (Four) Times a Day As Needed for Congestion or Cough. (Patient not taking: Reported on 6/18/2025), Disp: 300 mL, Rfl: 0    Allergies:  "  Allergies   Allergen Reactions    Amoxicillin-Pot Clavulanate Itching, Nausea Only, Other (See Comments), Rash and GI Intolerance    Latex Itching    Compazine [Prochlorperazine] Unknown - High Severity    Flonase [Fluticasone] Other (See Comments)     Jittery     Oxycodone-Acetaminophen Nausea Only    Penicillins Unknown - High Severity    Phenergan [Promethazine] Unknown - High Severity    Sulfamethoxazole-Trimethoprim Nausea Only    Zithromax [Azithromycin] Unknown - High Severity       Objective     Vital Signs  /62   Pulse 88   Temp 97.1 °F (36.2 °C) (Infrared)   Ht 154.9 cm (60.98\")   Wt 55.6 kg (122 lb 9.6 oz)   SpO2 99%   BMI 23.18 kg/m²   Estimated body mass index is 23.18 kg/m² as calculated from the following:    Height as of this encounter: 154.9 cm (60.98\").    Weight as of this encounter: 55.6 kg (122 lb 9.6 oz).    BMI is within normal parameters. No other follow-up for BMI required.       Physical Exam  Skin:     Comments: Healing laceration with well-approximated wound edges. Three steri-strips still in place. No surrounding erythema. No fluctuance or purulence.           Procedures     Results:  No results found for this or any previous visit (from the past 24 hours).     Assessment and Plan     Assessment/Plan:  Diagnoses and all orders for this visit:    1. Encounter for wound re-check (Primary)      Laceration to left anterior shin is healing well. Advised keeping steri-strips in place until they fall off. Then, apply vaseline and sun protection to prevent scarring. Reassured patient that there is no evidence of infection and the wound appears improved from prior visit. If any changes, contact office for reassessment.     Follow Up  No follow-ups on file.    Angelina Izquierdo MD   [facility]  "

## 2025-06-19 ENCOUNTER — TELEPHONE (OUTPATIENT)
Dept: NEUROLOGY | Facility: CLINIC | Age: 65
End: 2025-06-19
Payer: MEDICARE

## 2025-06-23 DIAGNOSIS — R73.09 ELEVATED GLUCOSE: ICD-10-CM

## 2025-06-23 RX ORDER — LANCETS 33 GAUGE
1 EACH MISCELLANEOUS 3 TIMES DAILY PRN
Qty: 100 EACH | Refills: 0 | Status: SHIPPED | OUTPATIENT
Start: 2025-06-23

## 2025-06-23 NOTE — TELEPHONE ENCOUNTER
Caller: Melly Cantor    Relationship: Self    Best call back number: [phone]     Requested Prescriptions:   Requested Prescriptions     Pending Prescriptions Disp Refills    glucose blood test strip 100 each 0     Sig: Use as instructed-use one daily Dx e11.9    Lancets (OneTouch Delica Plus [id number]) misc 100 each 0     Sig: Inject 1 Lancet under the skin into the appropriate area as directed 3 (Three) Times a Day As Needed (use to check blood glucose).        Pharmacy where request should be sent: [id number] [address]  [place] [facility] [place] [address] [place] [facility] [phone] [facility] [phone] FX     Last office visit with prescribing clinician: 2/4/2025   Last telemedicine visit with prescribing clinician: Visit date not found   Next office visit with prescribing clinician: Visit date not found     Additional details provided by patient:     Does the patient have less than a 3 day supply:  [x] Yes  [] No    Would you like a call back once the refill request has been completed: [x] Yes [] No    If the office needs to give you a call back, can they leave a voicemail: [x] Yes [] No    Cici Lyons   06/23/25 11:51 EDT

## 2025-07-11 ENCOUNTER — OFFICE VISIT (OUTPATIENT)
Dept: INTERNAL MEDICINE | Facility: CLINIC | Age: 65
End: 2025-07-11
Payer: MEDICARE

## 2025-07-11 VITALS
WEIGHT: 123 LBS | TEMPERATURE: 97.8 F | DIASTOLIC BLOOD PRESSURE: 62 MMHG | HEART RATE: 58 BPM | HEIGHT: 61 IN | OXYGEN SATURATION: 98 % | BODY MASS INDEX: 23.22 KG/M2 | SYSTOLIC BLOOD PRESSURE: 110 MMHG

## 2025-07-11 DIAGNOSIS — S81.811D LACERATION OF RIGHT LOWER EXTREMITY, SUBSEQUENT ENCOUNTER: Primary | ICD-10-CM

## 2025-07-11 NOTE — PROGRESS NOTES
Office Note     Name: Melly Cantor    : 1960     MRN: [id number]     Chief Complaint  Laceration (Cut on shin follow up. No pain )    Subjective     History of Present Illness:  Melly Cantor is a 65 y.o. female who presents today for laceration to shin.     Follow-up after being cut by glass on anterior right shin. Occurred about 1 month ago. No signs of infection. She has had steri strips on since the incident. She did go to  shortly after it occurred and it was determined that she did not need sutures.    Review of Systems:   Review of Systems    Past Medical History:   Past Medical History:   Diagnosis Date    Cataract     Hyperlipidemia     Prediabetes     Trigeminal neuralgia        Past Surgical History:   Past Surgical History:   Procedure Laterality Date     SECTION      KNEE ARTHROSCOPY      TONSILLECTOMY      TUBAL ABDOMINAL LIGATION         Family History:   Family History   Problem Relation Age of Onset    Hyperlipidemia Mother     Hypertension Mother     Diabetes Mother     Arthritis Mother     Heart attack Mother     Stroke Father     Kidney disease Father     Hyperlipidemia Father     Hypertension Father     Heart attack Father     Diabetes Father     Arthritis Father     Kidney cancer Father     Thyroid disease Sister     Hyperlipidemia Sister     Diabetes Sister     Thyroid disease Brother     Stroke Brother     Hyperlipidemia Brother     Hypertension Brother     Diabetes Brother     Breast cancer Maternal Aunt     Ovarian cancer Neg Hx        Social History:   Social History     Socioeconomic History    Marital status:    Tobacco Use    Smoking status: Never    Smokeless tobacco: Never   Vaping Use    Vaping status: Never Used   Substance and Sexual Activity    Alcohol use: Never    Drug use: Never    Sexual activity: Not Currently       Immunizations:   Immunization History   Administered Date(s) Administered    Tdap 2016        Medications:      Current Outpatient Medications:     Ascorbic Acid (VITAMIN C PO), Take 2,000 Units by mouth Daily., Disp: , Rfl:     carBAMazepine XR (TEGretol-XR) 100 MG 12 hr tablet, Take 1 tablet by mouth 2 (Two) Times a Day., Disp: 60 tablet, Rfl: 3    Cholecalciferol (VITAMIN D-3 PO), Take 4,000 Units by mouth Daily., Disp: , Rfl:     clindamycin (CLEOCIN) 300 MG capsule, Take 1 capsule by mouth 4 (Four) Times a Day., Disp: , Rfl:     Cosopt PF 2-0.5 % solution, USE 1 DROP INTO EACH EYE EVERY MORNING AND EVERY NIGHT AT BEDTIME, Disp: , Rfl:     glucose blood test strip, Use as instructed-use one daily Dx e11.9, Disp: 100 each, Rfl: 0    hydrOXYzine (ATARAX) 25 MG tablet, Take 1 tablet by mouth Every 8 (Eight) Hours As Needed for Itching., Disp: 30 tablet, Rfl: 5    ibuprofen (ADVIL,MOTRIN) 600 MG tablet, Take 1 tablet by mouth Every 6 (Six) Hours As Needed for Moderate Pain., Disp: 24 tablet, Rfl: 0    Iyuzeh 0.005 % solution, INSTILL 1 DROP INTO BOTH EYES AT NIGHT, Disp: , Rfl:     Lancets (OneTouch Delica Plus Orextx52E) misc, Inject 1 Lancet under the skin into the appropriate area as directed 3 (Three) Times a Day As Needed (use to check blood glucose)., Disp: 100 each, Rfl: 0    multivitamin with minerals tablet tablet, Take 1 tablet by mouth Daily., Disp: , Rfl:     Omega-3 Fatty Acids (OMEGA-3 FISH OIL PO), Take  by mouth., Disp: , Rfl:     sodium-potassium-magnesium sulfates (Suprep Bowel Prep Kit) 17.5-3.13-1.6 GM/177ML solution oral solution, Take 1 bottle by mouth Take As Directed. Follow instructions that were mailed to your home. If you didn't receive these call [phone]., Disp: 354 mL, Rfl: 0    Turmeric (QC Tumeric Complex) 500 MG capsule, Take  by mouth., Disp: , Rfl:     brompheniramine-pseudoephedrine-DM 30-2-10 MG/5ML syrup, Take 10 mL by mouth 4 (Four) Times a Day As Needed for Congestion or Cough. (Patient not taking: Reported on 7/11/2025), Disp: 300 mL, Rfl: 0    Allergies:   Allergies  "  Allergen Reactions    Amoxicillin-Pot Clavulanate Itching, Nausea Only, Other (See Comments), Rash and GI Intolerance    Latex Itching    Compazine [Prochlorperazine] Unknown - High Severity    Flonase [Fluticasone] Other (See Comments)     Jittery     Oxycodone-Acetaminophen Nausea Only    Penicillins Unknown - High Severity    Phenergan [Promethazine] Unknown - High Severity    Sulfamethoxazole-Trimethoprim Nausea Only    Zithromax [Azithromycin] Unknown - High Severity       Objective     Vital Signs  /62   Pulse 58   Temp 97.8 °F (36.6 °C) (Infrared)   Ht 154.9 cm (60.98\")   Wt 55.8 kg (123 lb)   SpO2 98%   BMI 23.25 kg/m²   Estimated body mass index is 23.25 kg/m² as calculated from the following:    Height as of this encounter: 154.9 cm (60.98\").    Weight as of this encounter: 55.8 kg (123 lb).    BMI is within normal parameters. No other follow-up for BMI required.       Physical Exam  Skin:     Comments: Laceration healing well with well-approximated edges. No induration or surrounding erythema. Two steri strips in-place which were easily removed with edges of wound remaining intact.           Procedures     Results:  No results found for this or any previous visit (from the past 24 hours).     Assessment and Plan     Assessment/Plan:  Diagnoses and all orders for this visit:    1. Laceration of right lower extremity, subsequent encounter (Primary)    Healing well. Steri-strips removed. Edges well-approximated. There will be a residual scar on her shin. We discussed Vaseline, scar cream and adequate sun protection to help reduce scarring. Avoid shaving in area while skin continues to heal     Follow Up  No follow-ups on file.    Angelina Izquierdo MD   [facility]  "

## 2025-07-24 DIAGNOSIS — R73.09 ELEVATED GLUCOSE: ICD-10-CM

## 2025-07-24 RX ORDER — LANCETS 33 GAUGE
EACH MISCELLANEOUS
Qty: 100 EACH | Refills: 0 | Status: SHIPPED | OUTPATIENT
Start: 2025-07-24

## 2025-07-24 NOTE — TELEPHONE ENCOUNTER
Rx Refill Note  Requested Prescriptions     Pending Prescriptions Disp Refills    Lancets (OneTouch Delica Plus [id number]) misc [Pharmacy Med Name: ONE TOUCH DELICA PLUS 33G LANCETS] 100 each 0     Sig: INJECT 1 LANCET UNDER THE SKIN INTO THE APPROPRIATE AREA AS DIRECTED THREE TIMES DAILY AS NEEDED      Last office visit with prescribing clinician: 2/4/2025   Last telemedicine visit with prescribing clinician: Visit date not found   Next office visit with prescribing clinician: 8/4/2025     Crystal Alexander MA  07/24/25, 16:03 EDT

## 2025-08-04 ENCOUNTER — OFFICE VISIT (OUTPATIENT)
Dept: INTERNAL MEDICINE | Facility: CLINIC | Age: 65
End: 2025-08-04
Payer: MEDICARE

## 2025-08-04 VITALS
BODY MASS INDEX: 23.03 KG/M2 | SYSTOLIC BLOOD PRESSURE: 122 MMHG | HEIGHT: 61 IN | TEMPERATURE: 97.1 F | RESPIRATION RATE: 14 BRPM | HEART RATE: 64 BPM | DIASTOLIC BLOOD PRESSURE: 76 MMHG | WEIGHT: 122 LBS | OXYGEN SATURATION: 98 %

## 2025-08-04 DIAGNOSIS — R10.11 RUQ ABDOMINAL PAIN: Primary | ICD-10-CM

## 2025-08-04 DIAGNOSIS — R07.89 CHEST DISCOMFORT: ICD-10-CM

## 2025-08-04 DIAGNOSIS — Z78.0 POSTMENOPAUSE: ICD-10-CM

## 2025-08-04 PROCEDURE — 93000 ELECTROCARDIOGRAM COMPLETE: CPT

## 2025-08-04 PROCEDURE — 1125F AMNT PAIN NOTED PAIN PRSNT: CPT

## 2025-08-04 PROCEDURE — 99214 OFFICE O/P EST MOD 30 MIN: CPT

## 2025-08-04 PROCEDURE — 1159F MED LIST DOCD IN RCRD: CPT

## 2025-08-04 PROCEDURE — 1160F RVW MEDS BY RX/DR IN RCRD: CPT

## 2025-08-04 RX ORDER — NETARSUDIL 0.2 MG/ML
SOLUTION/ DROPS OPHTHALMIC; TOPICAL
COMMUNITY
Start: 2025-07-25

## 2025-08-08 ENCOUNTER — LAB (OUTPATIENT)
Dept: INTERNAL MEDICINE | Facility: CLINIC | Age: 65
End: 2025-08-08
Payer: MEDICARE

## 2025-08-08 DIAGNOSIS — R10.11 RUQ ABDOMINAL PAIN: ICD-10-CM

## 2025-08-08 LAB
ALBUMIN SERPL-MCNC: 4.3 G/DL (ref 3.5–5.2)
ALBUMIN/GLOB SERPL: 1.5 G/DL
ALP SERPL-CCNC: 77 U/L (ref 39–117)
ALT SERPL W P-5'-P-CCNC: 13 U/L (ref 1–33)
AMYLASE SERPL-CCNC: 82 U/L (ref 28–100)
ANION GAP SERPL CALCULATED.3IONS-SCNC: 10.7 MMOL/L (ref 5–15)
AST SERPL-CCNC: 18 U/L (ref 1–32)
BASOPHILS # BLD AUTO: 0.08 10*3/MM3 (ref 0–0.2)
BASOPHILS NFR BLD AUTO: 1.4 % (ref 0–1.5)
BILIRUB SERPL-MCNC: 0.6 MG/DL (ref 0–1.2)
BUN SERPL-MCNC: 13 MG/DL (ref 8–23)
BUN/CREAT SERPL: 17.1 (ref 7–25)
CALCIUM SPEC-SCNC: 9.5 MG/DL (ref 8.6–10.5)
CHLORIDE SERPL-SCNC: 104 MMOL/L (ref 98–107)
CO2 SERPL-SCNC: 26.3 MMOL/L (ref 22–29)
CREAT SERPL-MCNC: 0.76 MG/DL (ref 0.57–1)
DEPRECATED RDW RBC AUTO: 39.9 FL (ref 37–54)
EGFRCR SERPLBLD CKD-EPI 2021: 87.1 ML/MIN/1.73
EOSINOPHIL # BLD AUTO: 0.37 10*3/MM3 (ref 0–0.4)
EOSINOPHIL NFR BLD AUTO: 6.3 % (ref 0.3–6.2)
ERYTHROCYTE [DISTWIDTH] IN BLOOD BY AUTOMATED COUNT: 12.3 % (ref 12.3–15.4)
GLOBULIN UR ELPH-MCNC: 2.9 GM/DL
GLUCOSE SERPL-MCNC: 95 MG/DL (ref 65–99)
HCT VFR BLD AUTO: 42.3 % (ref 34–46.6)
HGB BLD-MCNC: 13.6 G/DL (ref 12–15.9)
IMM GRANULOCYTES # BLD AUTO: 0.01 10*3/MM3 (ref 0–0.05)
IMM GRANULOCYTES NFR BLD AUTO: 0.2 % (ref 0–0.5)
LIPASE SERPL-CCNC: 21 U/L (ref 13–60)
LYMPHOCYTES # BLD AUTO: 2.26 10*3/MM3 (ref 0.7–3.1)
LYMPHOCYTES NFR BLD AUTO: 38.8 % (ref 19.6–45.3)
MCH RBC QN AUTO: 28.5 PG (ref 26.6–33)
MCHC RBC AUTO-ENTMCNC: 32.2 G/DL (ref 31.5–35.7)
MCV RBC AUTO: 88.7 FL (ref 79–97)
MONOCYTES # BLD AUTO: 0.37 10*3/MM3 (ref 0.1–0.9)
MONOCYTES NFR BLD AUTO: 6.3 % (ref 5–12)
NEUTROPHILS NFR BLD AUTO: 2.74 10*3/MM3 (ref 1.7–7)
NEUTROPHILS NFR BLD AUTO: 47 % (ref 42.7–76)
NRBC BLD AUTO-RTO: 0 /100 WBC (ref 0–0.2)
PLATELET # BLD AUTO: 237 10*3/MM3 (ref 140–450)
PMV BLD AUTO: 10.8 FL (ref 6–12)
POTASSIUM SERPL-SCNC: 4.6 MMOL/L (ref 3.5–5.2)
PROT SERPL-MCNC: 7.2 G/DL (ref 6–8.5)
RBC # BLD AUTO: 4.77 10*6/MM3 (ref 3.77–5.28)
SODIUM SERPL-SCNC: 141 MMOL/L (ref 136–145)
WBC NRBC COR # BLD AUTO: 5.83 10*3/MM3 (ref 3.4–10.8)

## 2025-08-08 PROCEDURE — 36415 COLL VENOUS BLD VENIPUNCTURE: CPT

## 2025-08-08 PROCEDURE — 85025 COMPLETE CBC W/AUTO DIFF WBC: CPT

## 2025-08-08 PROCEDURE — 83690 ASSAY OF LIPASE: CPT

## 2025-08-08 PROCEDURE — 82150 ASSAY OF AMYLASE: CPT

## 2025-08-08 PROCEDURE — 80053 COMPREHEN METABOLIC PANEL: CPT

## 2025-08-11 ENCOUNTER — RESULTS FOLLOW-UP (OUTPATIENT)
Dept: INTERNAL MEDICINE | Facility: CLINIC | Age: 65
End: 2025-08-11
Payer: MEDICARE

## 2025-08-11 DIAGNOSIS — R10.11 RUQ ABDOMINAL PAIN: Primary | ICD-10-CM

## 2025-08-11 DIAGNOSIS — K80.20 CALCULUS OF GALLBLADDER WITHOUT CHOLECYSTITIS WITHOUT OBSTRUCTION: ICD-10-CM

## 2025-08-15 ENCOUNTER — OFFICE VISIT (OUTPATIENT)
Dept: INTERNAL MEDICINE | Facility: CLINIC | Age: 65
End: 2025-08-15

## 2025-08-15 VITALS
SYSTOLIC BLOOD PRESSURE: 124 MMHG | HEART RATE: 108 BPM | HEIGHT: 62 IN | TEMPERATURE: 97.8 F | DIASTOLIC BLOOD PRESSURE: 64 MMHG | WEIGHT: 122.6 LBS | OXYGEN SATURATION: 98 % | RESPIRATION RATE: 16 BRPM | BODY MASS INDEX: 22.56 KG/M2

## 2025-08-15 DIAGNOSIS — Z20.822 CLOSE EXPOSURE TO COVID-19 VIRUS: ICD-10-CM

## 2025-08-15 DIAGNOSIS — U07.1 COVID-19 VIRUS INFECTION: Primary | ICD-10-CM

## 2025-08-15 LAB
EXPIRATION DATE: ABNORMAL
FLUAV AG UPPER RESP QL IA.RAPID: NOT DETECTED
FLUBV AG UPPER RESP QL IA.RAPID: NOT DETECTED
INTERNAL CONTROL: ABNORMAL
Lab: ABNORMAL
SARS-COV-2 AG UPPER RESP QL IA.RAPID: DETECTED

## 2025-08-15 PROCEDURE — 87428 SARSCOV & INF VIR A&B AG IA: CPT | Performed by: INTERNAL MEDICINE

## 2025-08-15 PROCEDURE — 99213 OFFICE O/P EST LOW 20 MIN: CPT | Performed by: INTERNAL MEDICINE

## 2025-08-15 RX ORDER — BENZONATATE 100 MG/1
100 CAPSULE ORAL 3 TIMES DAILY PRN
Qty: 30 CAPSULE | Refills: 0 | Status: SHIPPED | OUTPATIENT
Start: 2025-08-15